# Patient Record
Sex: FEMALE | Race: WHITE | Employment: FULL TIME | URBAN - METROPOLITAN AREA
[De-identification: names, ages, dates, MRNs, and addresses within clinical notes are randomized per-mention and may not be internally consistent; named-entity substitution may affect disease eponyms.]

---

## 2019-11-14 ENCOUNTER — TRANSFERRED RECORDS (OUTPATIENT)
Dept: HEALTH INFORMATION MANAGEMENT | Facility: CLINIC | Age: 29
End: 2019-11-14
Payer: COMMERCIAL

## 2019-11-14 LAB — PAP-ABSTRACT: NORMAL

## 2022-01-13 ENCOUNTER — TRANSFERRED RECORDS (OUTPATIENT)
Dept: HEALTH INFORMATION MANAGEMENT | Facility: CLINIC | Age: 32
End: 2022-01-13

## 2022-01-25 ENCOUNTER — OFFICE VISIT (OUTPATIENT)
Dept: FAMILY MEDICINE | Facility: CLINIC | Age: 32
End: 2022-01-25
Payer: COMMERCIAL

## 2022-01-25 VITALS
DIASTOLIC BLOOD PRESSURE: 78 MMHG | RESPIRATION RATE: 12 BRPM | TEMPERATURE: 98.5 F | OXYGEN SATURATION: 96 % | HEART RATE: 84 BPM | SYSTOLIC BLOOD PRESSURE: 112 MMHG

## 2022-01-25 DIAGNOSIS — Z76.89 ESTABLISHING CARE WITH NEW DOCTOR, ENCOUNTER FOR: ICD-10-CM

## 2022-01-25 DIAGNOSIS — Z30.015 ENCOUNTER FOR INITIAL PRESCRIPTION OF VAGINAL RING HORMONAL CONTRACEPTIVE: Primary | ICD-10-CM

## 2022-01-25 PROBLEM — O42.90 DELAYED DELIVERY AFTER SROM (SPONTANEOUS RUPTURE OF MEMBRANES): Status: ACTIVE | Noted: 2021-03-27

## 2022-01-25 PROCEDURE — 99203 OFFICE O/P NEW LOW 30 MIN: CPT | Performed by: NURSE PRACTITIONER

## 2022-01-25 RX ORDER — ETONOGESTREL AND ETHINYL ESTRADIOL VAGINAL RING .015; .12 MG/D; MG/D
1 RING VAGINAL
Qty: 3 EACH | Refills: 0 | Status: SHIPPED | OUTPATIENT
Start: 2022-01-25 | End: 2022-05-10

## 2022-01-25 ASSESSMENT — PATIENT HEALTH QUESTIONNAIRE - PHQ9
SUM OF ALL RESPONSES TO PHQ QUESTIONS 1-9: 6
SUM OF ALL RESPONSES TO PHQ QUESTIONS 1-9: 6
10. IF YOU CHECKED OFF ANY PROBLEMS, HOW DIFFICULT HAVE THESE PROBLEMS MADE IT FOR YOU TO DO YOUR WORK, TAKE CARE OF THINGS AT HOME, OR GET ALONG WITH OTHER PEOPLE: SOMEWHAT DIFFICULT

## 2022-01-25 NOTE — PROGRESS NOTES
"  Assessment & Plan     Encounter for initial prescription of vaginal ring hormonal contraceptive  Discussed options of contraceptives vs mood management. Patient desires to remove Nexplanon and restart NuvaRing. If not helpful in balancing mood, would consider medication for mood at that time.   - etonogestrel-ethinyl estradiol (NUVARING) 0.12-0.015 MG/24HR vaginal ring  Dispense: 3 each; Refill: 0    Establishing care with new doctor, encounter for  Medical, surgical, family history update.                    Return in about 2 weeks (around 2/8/2022) for appointment already scheduled, nexplanon removal.    JAMAL Torres CNP  M [facility] CINDY Arroyo is a 31 year old who presents for the following health issues     HPI     Concern - Establish Care  Consult with Birth control-not liking the way pt feels on the Nexplanon, has been feeling fatigued, \"numbness\" and flat no emotions. Unsure if wants to be on birth control    nexplanon placed 6 weeks postpartum 5/2021 at OB clinc. Has used in past with potentially depressed mood.  Used NuvaRing in past with good control of periods and mood.   Does not feel mood is specific to a mood disorder but feels nexplanon is contributing.   OCP with highly depressed mood and acne.   No family history of mood disorders.     PHQ 1/25/2022 1/25/2022   PHQ-9 Total Score 6 7   Q9: Thoughts of better off dead/self-harm past 2 weeks Not at all Not at all       Review of Systems   Constitutional, HEENT, cardiovascular, pulmonary, gi and gu systems are negative, except as otherwise noted.      Objective    /78 (BP Location: Right arm, Patient Position: Chair, Cuff Size: Adult Regular)   Pulse 84   Temp 98.5  F (36.9  C) (Oral)   Resp 12   SpO2 96%   There is no height or weight on file to calculate BMI.  Physical Exam   GENERAL: healthy, alert and no distress  RESP: regular rate and effort.                   "

## 2022-02-08 ENCOUNTER — OFFICE VISIT (OUTPATIENT)
Dept: FAMILY MEDICINE | Facility: CLINIC | Age: 32
End: 2022-02-08
Payer: COMMERCIAL

## 2022-02-08 VITALS
BODY MASS INDEX: 28.56 KG/M2 | HEART RATE: 64 BPM | SYSTOLIC BLOOD PRESSURE: 112 MMHG | HEIGHT: 63 IN | OXYGEN SATURATION: 97 % | DIASTOLIC BLOOD PRESSURE: 58 MMHG | RESPIRATION RATE: 13 BRPM | TEMPERATURE: 98.3 F | WEIGHT: 161.2 LBS

## 2022-02-08 DIAGNOSIS — Z30.46 NEXPLANON REMOVAL: Primary | ICD-10-CM

## 2022-02-08 PROCEDURE — 11982 REMOVE DRUG IMPLANT DEVICE: CPT | Performed by: NURSE PRACTITIONER

## 2022-02-08 ASSESSMENT — ANXIETY QUESTIONNAIRES
GAD7 TOTAL SCORE: 1
GAD7 TOTAL SCORE: 1
6. BECOMING EASILY ANNOYED OR IRRITABLE: SEVERAL DAYS
7. FEELING AFRAID AS IF SOMETHING AWFUL MIGHT HAPPEN: NOT AT ALL
7. FEELING AFRAID AS IF SOMETHING AWFUL MIGHT HAPPEN: NOT AT ALL
1. FEELING NERVOUS, ANXIOUS, OR ON EDGE: NOT AT ALL
5. BEING SO RESTLESS THAT IT IS HARD TO SIT STILL: NOT AT ALL
2. NOT BEING ABLE TO STOP OR CONTROL WORRYING: NOT AT ALL
GAD7 TOTAL SCORE: 1
4. TROUBLE RELAXING: NOT AT ALL
3. WORRYING TOO MUCH ABOUT DIFFERENT THINGS: NOT AT ALL

## 2022-02-08 ASSESSMENT — PATIENT HEALTH QUESTIONNAIRE - PHQ9
SUM OF ALL RESPONSES TO PHQ QUESTIONS 1-9: 2
10. IF YOU CHECKED OFF ANY PROBLEMS, HOW DIFFICULT HAVE THESE PROBLEMS MADE IT FOR YOU TO DO YOUR WORK, TAKE CARE OF THINGS AT HOME, OR GET ALONG WITH OTHER PEOPLE: SOMEWHAT DIFFICULT
SUM OF ALL RESPONSES TO PHQ QUESTIONS 1-9: 2

## 2022-02-08 ASSESSMENT — MIFFLIN-ST. JEOR: SCORE: 1420.33

## 2022-02-08 NOTE — PROGRESS NOTES
Nexplanon Removal:     Is a pregnancy test required: No.  Was a consent obtained?  Yes    Dina Ocampo is here for removal of etonogestrel implant Nexplanon/Implanon    Indication: removal of implant contraceptive due to mood concerns.       Preoperative Diagnosis: etonogestrel implant  Postoperative Diagnosis: etonogestrel implant removed    Technique: On the left arm  Skin prep Betadine  Anesthesia 1% lidocaine, without epi  Procedure: Small incision (<5mm) was made at distal end of palpable implant, curved hemostat or mosquito forceps was used to isolate the implant and bring it to the incision, the fibrous capsule containing the implant  was incised and the Implant was removed intact.    EBL: minimal  Complications:  No  Tolerance:  Pt tolerated procedure well and was in stable condition.   Dressing:    A pressure bandage was placed for the next 12-24 hours.    Contraception was discussed and patient chose the following method ring      Follow up: Pt was instructed to call if bleeding, severe pain or foul smell.     JAMAL Torres CNP

## 2022-02-09 ASSESSMENT — ANXIETY QUESTIONNAIRES: GAD7 TOTAL SCORE: 1

## 2022-02-15 ENCOUNTER — TELEPHONE (OUTPATIENT)
Dept: FAMILY MEDICINE | Facility: CLINIC | Age: 32
End: 2022-02-15
Payer: COMMERCIAL

## 2022-02-15 NOTE — TELEPHONE ENCOUNTER
Patient Quality Outreach    Patient is due for the following:   Cervical Cancer Screening - PAP Needed    NEXT STEPS:   Chart routed to abstraction.    11/14/19 [facility]     Type of outreach:    Chart review performed, no outreach needed.      Questions for provider review:    None     Oralia Contreras MA

## 2022-05-10 ENCOUNTER — OFFICE VISIT (OUTPATIENT)
Dept: OBGYN | Facility: CLINIC | Age: 32
End: 2022-05-10
Payer: COMMERCIAL

## 2022-05-10 VITALS — DIASTOLIC BLOOD PRESSURE: 76 MMHG | BODY MASS INDEX: 27.12 KG/M2 | WEIGHT: 153.1 LBS | SYSTOLIC BLOOD PRESSURE: 118 MMHG

## 2022-05-10 DIAGNOSIS — N93.0 POSTCOITAL BLEEDING: ICD-10-CM

## 2022-05-10 DIAGNOSIS — R10.2 PELVIC PAIN IN FEMALE: ICD-10-CM

## 2022-05-10 DIAGNOSIS — Z12.4 SCREENING FOR CERVICAL CANCER: ICD-10-CM

## 2022-05-10 DIAGNOSIS — N92.0 INTERMENSTRUAL SPOTTING: Primary | ICD-10-CM

## 2022-05-10 DIAGNOSIS — Z11.3 SCREEN FOR STD (SEXUALLY TRANSMITTED DISEASE): ICD-10-CM

## 2022-05-10 DIAGNOSIS — N89.8 VAGINAL DISCHARGE: ICD-10-CM

## 2022-05-10 LAB
CLUE CELLS: ABNORMAL
TRICHOMONAS, WET PREP: ABNORMAL
WBC'S/HIGH POWER FIELD, WET PREP: ABNORMAL
YEAST, WET PREP: ABNORMAL

## 2022-05-10 PROCEDURE — G0145 SCR C/V CYTO,THINLAYER,RESCR: HCPCS | Performed by: OBSTETRICS & GYNECOLOGY

## 2022-05-10 PROCEDURE — 87591 N.GONORRHOEAE DNA AMP PROB: CPT | Performed by: OBSTETRICS & GYNECOLOGY

## 2022-05-10 PROCEDURE — 99204 OFFICE O/P NEW MOD 45 MIN: CPT | Performed by: OBSTETRICS & GYNECOLOGY

## 2022-05-10 PROCEDURE — 87210 SMEAR WET MOUNT SALINE/INK: CPT | Performed by: OBSTETRICS & GYNECOLOGY

## 2022-05-10 PROCEDURE — 87491 CHLMYD TRACH DNA AMP PROBE: CPT | Performed by: OBSTETRICS & GYNECOLOGY

## 2022-05-10 PROCEDURE — 87624 HPV HI-RISK TYP POOLED RSLT: CPT | Performed by: OBSTETRICS & GYNECOLOGY

## 2022-05-10 NOTE — NURSING NOTE
"Chief Complaint   Patient presents with     Consult     AUB, for the past month patient reports spotting daily, and then bleeding after intercourse. Spotting is usually dark brown, but after intercourse it is bright red. Patient reports increased pelvic pressure, and rectal pain. No c/o pain with urination, odor, itching. Spotting is not heavy enough to wear pad/tampon       Initial /76   Wt 69.4 kg (153 lb 1.6 oz)   LMP 04/15/2022   BMI 27.12 kg/m   Estimated body mass index is 27.12 kg/m  as calculated from the following:    Height as of 22: 1.6 m (5' 3\").    Weight as of this encounter: 69.4 kg (153 lb 1.6 oz).  BP completed using cuff size: regular    Questioned patient about current smoking habits.  Pt. has never smoked.          The following HM Due: pap smear      Geovanny Tracy CMA               "

## 2022-05-10 NOTE — PROGRESS NOTES
SUBJECTIVE:   CC: spotting, post-coital bleeding                                               Dina Ocampo is a 31 year old  female who presents to clinic today for daily spotting x30 days. Previously on oral contraceptives prior to birth of her son 13 months ago, never had any spotting on that. Used the nexplanon briefly postpartum but had it removed 2022, 2 regular menses since then lasting 4-5 days, not too heavy. No dysmenorrhea. Patient's last menstrual period was 04/15/2022. Not currently using contraception. Would be ok with pregnancy but not actively trying. Home urine pregnancy test yesterday and 1 week ago both negative.   With intercourse she reports bright red spotting/bleeding. Last night for the first time had discomfort following sex. Crampy low pelvic pain preceding intercourse     Problem list and histories reviewed & adjusted, as indicated.  Additional history: as documented.    Patient Active Problem List   Diagnosis     Acne     Adjustment disorder with depressed mood     Breech birth     Delayed delivery after SROM (spontaneous rupture of membranes)     Major depressive disorder, recurrent episode, moderate (H)     Past Surgical History:   Procedure Laterality Date      SECTION        Social History     Tobacco Use     Smoking status: Never Smoker     Smokeless tobacco: Never Used   Substance Use Topics     Alcohol use: Yes     Comment: rare      Problem (# of Occurrences) Relation (Name,Age of Onset)    No Known Problems (2) Mother, Father            No current outpatient medications on file prior to visit.  No current facility-administered medications on file prior to visit.    Allergies   Allergen Reactions     Sulfa Drugs        OBJECTIVE:   /76   Wt 69.4 kg (153 lb 1.6 oz)   LMP 04/15/2022   BMI 27.12 kg/m     Const: sitting in chair in no acute distress, comfortable  Eyes: no scleral icterus, EOMI  CV: regular rate, well perfused  Pulm: no increased work  of breathing, no cough  Skin: warm and dry, no rashes/lesions  Psych: mood stable, appropriate affect  Neuro: A+Ox3   : External genitalia normal well-estrogenized, healthy tissue.  No obvious excoriations, lesions, or rashes. Bartholins, urethra, normal.  Normal moist pink vaginal mucosa.  SSE: Normal cervix, normal physiologic discharge, no blood in the vault   Bimanual: No CMT, small mobile uterus. No adnexal masses or tenderness appreciated throughout bimanual exam.     ASSESSMENT/PLAN:                                                    Dina Ocampo is a 31 year old female  here for evaluation of 1 month of spotting and new onset pelvic cramping x48 hours.    1. Intermenstrual spotting  - in a non-contracepting patient who is sexually active, cannot rule out pregnancy. However, with a negative home urine pregnancy test yesterday plan to have her repeat again at home in 1 week unless her menses begins.  -  Gonorrhea/chlam   - US Pelvic Transabdominal and Transvaginal; Future    2. Pelvic pain in female  - suspect primary dysmenorrhea/moliminal symptoms as she is due for a period any day, but again, cannot rule out early pregnancy which may also lead to cramping. Reviewed prostaglandin mediated cramping. ddx includes STI and structural abnormalities. Will further evaluate with US, gonorrhea/chlam and wet prep obtained today.     Obtain US and f/u in 2-4w.       Agnieszka Patel MD  Obstetrics and Gynecology   [facility]

## 2022-05-10 NOTE — PATIENT INSTRUCTIONS
Dysmenorrhea:    - We reviewed causes of dysmenorrhea, including prostaglandin mediated cramping, structural abnormalities (fibroids, polyps, adenomyosis), and endometriosis.   - Scheduled Ibuprofen 600mg every 4 hours or 800mg every 6 hours OR Aleve 2 tabs every 8-12 hours throughout menses and starting the night before bleeding. Additionally, heating pads to the lower abdomen can be helpful.  -  Reviewed options for hormonal birth control, including oral contraceptive pills, patches, vaginal ring, implant, shot, IUD (hormonal), as well as hysteroscopy D&C, endometrial ablation or hysterectomy, if refractory to the above methods.     - will obtain pelvic US and review cause and management options when results are available.

## 2022-05-11 LAB
C TRACH DNA SPEC QL NAA+PROBE: NEGATIVE
N GONORRHOEA DNA SPEC QL NAA+PROBE: NEGATIVE

## 2022-05-13 LAB
BKR LAB AP GYN ADEQUACY: NORMAL
BKR LAB AP GYN INTERPRETATION: NORMAL
BKR LAB AP HPV REFLEX: NORMAL
BKR LAB AP LMP: NORMAL
BKR LAB AP PREVIOUS ABNORMAL: NORMAL
PATH REPORT.COMMENTS IMP SPEC: NORMAL
PATH REPORT.COMMENTS IMP SPEC: NORMAL
PATH REPORT.RELEVANT HX SPEC: NORMAL

## 2022-05-17 LAB
HUMAN PAPILLOMA VIRUS 16 DNA: NEGATIVE
HUMAN PAPILLOMA VIRUS 18 DNA: NEGATIVE
HUMAN PAPILLOMA VIRUS FINAL DIAGNOSIS: NORMAL
HUMAN PAPILLOMA VIRUS OTHER HR: NEGATIVE

## 2022-05-27 ENCOUNTER — ANCILLARY PROCEDURE (OUTPATIENT)
Dept: ULTRASOUND IMAGING | Facility: CLINIC | Age: 32
End: 2022-05-27
Attending: OBSTETRICS & GYNECOLOGY
Payer: COMMERCIAL

## 2022-05-27 DIAGNOSIS — N92.0 INTERMENSTRUAL SPOTTING: ICD-10-CM

## 2022-05-27 PROCEDURE — 76856 US EXAM PELVIC COMPLETE: CPT | Performed by: OBSTETRICS & GYNECOLOGY

## 2022-05-27 PROCEDURE — 76830 TRANSVAGINAL US NON-OB: CPT | Performed by: OBSTETRICS & GYNECOLOGY

## 2022-07-03 ENCOUNTER — HEALTH MAINTENANCE LETTER (OUTPATIENT)
Age: 32
End: 2022-07-03

## 2022-10-10 ENCOUNTER — TRANSFERRED RECORDS (OUTPATIENT)
Dept: HEALTH INFORMATION MANAGEMENT | Facility: CLINIC | Age: 32
End: 2022-10-10

## 2022-10-21 ENCOUNTER — TRANSFERRED RECORDS (OUTPATIENT)
Dept: HEALTH INFORMATION MANAGEMENT | Facility: CLINIC | Age: 32
End: 2022-10-21

## 2022-10-24 ENCOUNTER — MEDICAL CORRESPONDENCE (OUTPATIENT)
Dept: HEALTH INFORMATION MANAGEMENT | Facility: CLINIC | Age: 32
End: 2022-10-24

## 2022-10-26 ENCOUNTER — TRANSCRIBE ORDERS (OUTPATIENT)
Dept: OTHER | Age: 32
End: 2022-10-26

## 2022-10-26 DIAGNOSIS — R79.82 CRP ELEVATED: ICD-10-CM

## 2022-10-26 DIAGNOSIS — Z13.29 SCREENING FOR THYROID DISORDER: Primary | ICD-10-CM

## 2023-01-14 ENCOUNTER — HEALTH MAINTENANCE LETTER (OUTPATIENT)
Age: 33
End: 2023-01-14

## 2023-02-03 ENCOUNTER — HOSPITAL ENCOUNTER (OUTPATIENT)
Dept: GENERAL RADIOLOGY | Facility: CLINIC | Age: 33
Discharge: HOME | End: 2023-02-03
Attending: NURSE PRACTITIONER | Admitting: NURSE PRACTITIONER
Payer: COMMERCIAL

## 2023-02-03 DIAGNOSIS — Z31.9 ENCOUNTER FOR PROCREATIVE MANAGEMENT, UNSPECIFIED: ICD-10-CM

## 2023-02-03 PROCEDURE — 58340 CATHETER FOR HYSTEROGRAPHY: CPT

## 2023-07-22 ENCOUNTER — HEALTH MAINTENANCE LETTER (OUTPATIENT)
Age: 33
End: 2023-07-22

## 2023-09-11 ENCOUNTER — HOSPITAL ENCOUNTER (EMERGENCY)
Facility: CLINIC | Age: 33
Discharge: HOME | End: 2023-09-11
Attending: EMERGENCY MEDICINE | Admitting: EMERGENCY MEDICINE
Payer: COMMERCIAL

## 2023-09-11 VITALS
WEIGHT: 145 LBS | RESPIRATION RATE: 18 BRPM | BODY MASS INDEX: 25.69 KG/M2 | TEMPERATURE: 97.5 F | SYSTOLIC BLOOD PRESSURE: 108 MMHG | HEART RATE: 64 BPM | OXYGEN SATURATION: 100 % | HEIGHT: 63 IN | DIASTOLIC BLOOD PRESSURE: 70 MMHG

## 2023-09-11 DIAGNOSIS — O00.90 ECTOPIC PREGNANCY WITHOUT INTRAUTERINE PREGNANCY, UNSPECIFIED LOCATION: ICD-10-CM

## 2023-09-11 LAB
ABO/RH(D): NORMAL
ALBUMIN SERPL BCG-MCNC: 5 G/DL (ref 3.5–5.2)
ALP SERPL-CCNC: 55 U/L (ref 35–104)
ALT SERPL W P-5'-P-CCNC: 14 U/L (ref 0–50)
ANION GAP SERPL CALCULATED.3IONS-SCNC: 10 MMOL/L (ref 7–15)
ANTIBODY SCREEN: NEGATIVE
AST SERPL W P-5'-P-CCNC: 18 U/L (ref 0–45)
BASOPHILS # BLD AUTO: 0 10E3/UL (ref 0–0.2)
BASOPHILS NFR BLD AUTO: 1 %
BILIRUB DIRECT SERPL-MCNC: <0.2 MG/DL (ref 0–0.3)
BILIRUB SERPL-MCNC: 0.4 MG/DL
BUN SERPL-MCNC: 12.6 MG/DL (ref 6–20)
CALCIUM SERPL-MCNC: 9.6 MG/DL (ref 8.6–10)
CHLORIDE SERPL-SCNC: 103 MMOL/L (ref 98–107)
CREAT SERPL-MCNC: 0.77 MG/DL (ref 0.51–0.95)
DEPRECATED HCO3 PLAS-SCNC: 25 MMOL/L (ref 22–29)
EGFRCR SERPLBLD CKD-EPI 2021: >90 ML/MIN/1.73M2
EOSINOPHIL # BLD AUTO: 0 10E3/UL (ref 0–0.7)
EOSINOPHIL NFR BLD AUTO: 0 %
ERYTHROCYTE [DISTWIDTH] IN BLOOD BY AUTOMATED COUNT: 11.7 % (ref 10–15)
GLUCOSE SERPL-MCNC: 108 MG/DL (ref 70–99)
HCG INTACT+B SERPL-ACNC: 56 MIU/ML
HCT VFR BLD AUTO: 43.5 % (ref 35–47)
HGB BLD-MCNC: 14.6 G/DL (ref 11.7–15.7)
HOLD SPECIMEN: NORMAL
HOLD SPECIMEN: NORMAL
IMM GRANULOCYTES # BLD: 0 10E3/UL
IMM GRANULOCYTES NFR BLD: 0 %
LYMPHOCYTES # BLD AUTO: 1.3 10E3/UL (ref 0.8–5.3)
LYMPHOCYTES NFR BLD AUTO: 18 %
MCH RBC QN AUTO: 29.8 PG (ref 26.5–33)
MCHC RBC AUTO-ENTMCNC: 33.6 G/DL (ref 31.5–36.5)
MCV RBC AUTO: 89 FL (ref 78–100)
MONOCYTES # BLD AUTO: 0.4 10E3/UL (ref 0–1.3)
MONOCYTES NFR BLD AUTO: 5 %
NEUTROPHILS # BLD AUTO: 5.2 10E3/UL (ref 1.6–8.3)
NEUTROPHILS NFR BLD AUTO: 76 %
NRBC # BLD AUTO: 0 10E3/UL
NRBC BLD AUTO-RTO: 0 /100
PLATELET # BLD AUTO: 289 10E3/UL (ref 150–450)
POTASSIUM SERPL-SCNC: 4.2 MMOL/L (ref 3.4–5.3)
PROT SERPL-MCNC: 7.2 G/DL (ref 6.4–8.3)
RBC # BLD AUTO: 4.9 10E6/UL (ref 3.8–5.2)
SODIUM SERPL-SCNC: 138 MMOL/L (ref 136–145)
SPECIMEN EXPIRATION DATE: NORMAL
URATE SERPL-MCNC: 3 MG/DL (ref 2.4–5.7)
WBC # BLD AUTO: 6.9 10E3/UL (ref 4–11)

## 2023-09-11 PROCEDURE — 85004 AUTOMATED DIFF WBC COUNT: CPT | Performed by: EMERGENCY MEDICINE

## 2023-09-11 PROCEDURE — 82248 BILIRUBIN DIRECT: CPT | Performed by: EMERGENCY MEDICINE

## 2023-09-11 PROCEDURE — 80053 COMPREHEN METABOLIC PANEL: CPT | Performed by: EMERGENCY MEDICINE

## 2023-09-11 PROCEDURE — 85025 COMPLETE CBC W/AUTO DIFF WBC: CPT | Performed by: EMERGENCY MEDICINE

## 2023-09-11 PROCEDURE — 99284 EMERGENCY DEPT VISIT MOD MDM: CPT

## 2023-09-11 PROCEDURE — 250N000011 HC RX IP 250 OP 636: Mod: JZ | Performed by: EMERGENCY MEDICINE

## 2023-09-11 PROCEDURE — 99284 EMERGENCY DEPT VISIT MOD MDM: CPT | Mod: 25

## 2023-09-11 PROCEDURE — 84550 ASSAY OF BLOOD/URIC ACID: CPT | Performed by: EMERGENCY MEDICINE

## 2023-09-11 PROCEDURE — 84702 CHORIONIC GONADOTROPIN TEST: CPT | Performed by: EMERGENCY MEDICINE

## 2023-09-11 PROCEDURE — 86900 BLOOD TYPING SEROLOGIC ABO: CPT | Performed by: EMERGENCY MEDICINE

## 2023-09-11 PROCEDURE — 96401 CHEMO ANTI-NEOPL SQ/IM: CPT

## 2023-09-11 PROCEDURE — 36415 COLL VENOUS BLD VENIPUNCTURE: CPT | Performed by: EMERGENCY MEDICINE

## 2023-09-11 RX ORDER — METHOTREXATE 25 MG/ML
50 INJECTION INTRA-ARTERIAL; INTRAMUSCULAR; INTRATHECAL; INTRAVENOUS ONCE
Status: COMPLETED | OUTPATIENT
Start: 2023-09-11 | End: 2023-09-11

## 2023-09-11 RX ADMIN — METHOTREXATE 85.5 MG: 25 INJECTION, SOLUTION INTRA-ARTERIAL; INTRAMUSCULAR; INTRAVENOUS at 14:00

## 2023-09-11 ASSESSMENT — ACTIVITIES OF DAILY LIVING (ADL): ADLS_ACUITY_SCORE: 35

## 2023-09-11 NOTE — ED TRIAGE NOTES
Patient reports being seen this morning at her OB clinic for possible ectopic pregnancy.  US done per pt.  Patient reports being about 7 weeks pregnant.  She reports vaginal bleeding since 9/6.  ABCs intact,A&Ox4.     Triage Assessment       Row Name 09/11/23 1028       Triage Assessment (Adult)    Airway WDL WDL       Respiratory WDL    Respiratory WDL WDL       Skin Circulation/Temperature WDL    Skin Circulation/Temperature WDL WDL       Cardiac WDL    Cardiac WDL WDL       Peripheral/Neurovascular WDL    Peripheral Neurovascular WDL WDL       Cognitive/Neuro/Behavioral WDL    Cognitive/Neuro/Behavioral WDL WDL

## 2023-09-11 NOTE — DISCHARGE INSTRUCTIONS
You need to return immediately to the emergency department if you have sudden worsening of abdominal pain, sudden worsening of bleeding (especially if it is heavier than feeling 1 pad per hour), or any other concerns.    Otherwise, you need to follow-up with your obstetrician on 9/15/2023 and 9/18/2023 for more blood tests.

## 2023-09-11 NOTE — ED PROVIDER NOTES
"    History     Chief Complaint:  Vaginal Bleeding - Pregnant       The history is provided by the patient.      Dina Ocampo is a 32 year old  female at 7w0d by stated LMP who presents with probable ectopic pregnancy and vaginal bleeding.  She has had left pelvic cramping for about 2 weeks.  She developed vaginal bleeding 5 days ago and was seen by her OB who felt she was having a miscarriage.  Her vaginal bleeding worsened over the last couple of days but seems to be improving now.  She returned today to obstetrics for follow-up ultrasound which revealed no intrauterine pregnancy and a \"round complex structure with vascular supply in the left adnexa medial to the left ovary, suspicious for ectopic 1.4 x 1.2 x 1.1 cm.\"  She was sent to the emergency department for \"labs and a shot\" per her understanding.    Independent Historian:    As above    Review of External Notes:  Records provided by [facility] including ultrasound, as above.  This also includes hCG of 64 on 2023 and 47 23    Past Medical History:    Past Medical History:   Diagnosis Date    No pertinent past medical history      Past Surgical History:    Past Surgical History:   Procedure Laterality Date     SECTION        Physical Exam   Patient Vitals for the past 24 hrs:   BP Temp Temp src Pulse Resp SpO2 Height Weight   23 1437 108/70 -- -- 64 18 100 % -- --   23 1417 113/76 -- -- 69 18 99 % -- --   23 1100 -- -- -- -- -- -- 1.6 m (5' 3\") 65.8 kg (145 lb)   23 1029 (!) 124/92 97.5  F (36.4  C) Temporal 82 16 99 % -- --        Physical Exam  General: Well-developed and well-nourished. Well appearing young  woman. Cooperative.  Head:  Atraumatic.  Eyes:  Conjunctivae, lids, and sclerae are normal.  ENT:    Normal nose. Moist mucous membranes.  Neck:  Supple. Normal range of motion.  CV:  Well perfused.  Resp:  No respiratory distress.  GI:  Soft. Non-distended. " Non-tender.    MS:  Normal ROM.   Skin:  Warm. Non-diaphoretic. No pallor.  Neuro:  Awake. A&Ox3. Normal strength.  Psych: Appropriate mood and affect. Normal speech.  Vitals reviewed.    Emergency Department Course   Laboratory:  Labs Ordered and Resulted from Time of ED Arrival to Time of ED Departure   BASIC METABOLIC PANEL - Abnormal       Result Value    Sodium 138      Potassium 4.2      Chloride 103      Carbon Dioxide (CO2) 25      Anion Gap 10      Urea Nitrogen 12.6      Creatinine 0.77      Calcium 9.6      Glucose 108 (*)     GFR Estimate >90     HCG QUANTITATIVE PREGNANCY - Abnormal    hCG Quantitative 56 (*)    HEPATIC FUNCTION PANEL - Normal    Protein Total 7.2      Albumin 5.0      Bilirubin Total 0.4      Alkaline Phosphatase 55      AST 18      ALT 14      Bilirubin Direct <0.20     URIC ACID - Normal    Uric Acid 3.0     CBC WITH PLATELETS AND DIFFERENTIAL    WBC Count 6.9      RBC Count 4.90      Hemoglobin 14.6      Hematocrit 43.5      MCV 89      MCH 29.8      MCHC 33.6      RDW 11.7      Platelet Count 289      % Neutrophils 76      % Lymphocytes 18      % Monocytes 5      % Eosinophils 0      % Basophils 1      % Immature Granulocytes 0      NRBCs per 100 WBC 0      Absolute Neutrophils 5.2      Absolute Lymphocytes 1.3      Absolute Monocytes 0.4      Absolute Eosinophils 0.0      Absolute Basophils 0.0      Absolute Immature Granulocytes 0.0      Absolute NRBCs 0.0     TYPE AND SCREEN, ADULT    ABO/RH(D) O POS      Antibody Screen Negative      SPECIMEN EXPIRATION DATE [id number]     ABO/RH TYPE AND SCREEN      Emergency Department Course & Assessments:  Interventions:  Medications   methotrexate sodium (PF) injection 85.5 mg (85.5 mg Intramuscular $Given 9/11/23 1400)      Assessments:  1244 I updated the patient and she is comfortable with plan.     Independent Interpretation (X-rays, CTs, rhythm strip):  Not applicable    Consultations/Discussion of Management or Tests:  1409 I  spoke with Dr. Dos Santos, OB/Gyn, regarding the patient. He agrees with plan.     Social Determinants of Health affecting care:  Supportive   Established care provider     Disposition:  Discharged  Impression & Plan    Medical Decision Making:  Dina is a  at 7w0d by stated LMP who has had left pelvic cramping for couple of weeks and then developed vaginal bleeding 5 days ago. This bleeding has improved, but not resolved. She was seen by her OB and had a downward trending hCG to 47 and there was concern for miscarriage.  Follow-up today (3 days later) with ultrasound revealed probable ectopic pregnancy and she was referred to the emergency department seemingly for methotrexate injection.  I did review paperwork sent with the patient and faxed by her clinic which revealed a complex structure in her left adnexa suspicious for ectopic.  She appears well on exam without significant abdominal tenderness.  There is no indication for repeat imaging. There is no indication for pelvic exam.    Laboratory studies were obtained which are unremarkable.  hCG is very low at 56, inappropriate for gestational age, consistent with nonviable pregnancy.  She is Rh+ and does not require RhoGAM.  Her case was reviewed with , OB/Gyn, who agreed with plan for methotrexate.  She was given 50 mg/m  IM methotrexate without issue.  She understands plan for day 4 and day 7 beta-hCG levels with her obstetric clinic.  In the meantime I have given her strict return precautions including, but not limited to, sudden worsening of abdominal pain or bleeding.  All questions answered.  Amenable to discharge.      Diagnosis:    ICD-10-CM    1. Ectopic pregnancy without intrauterine pregnancy, unspecified location  O00.90            Discharge Medications:  There are no discharge medications for this patient.     2023   Trini Vega MD [place], Kylie S, MD  23 0852

## 2024-01-13 LAB
HEPATITIS B SURFACE ANTIGEN (EXTERNAL): NONREACTIVE
HIV1+2 AB SERPL QL IA: NONREACTIVE
RUBELLA ANTIBODY IGG (EXTERNAL): NORMAL
VDRL (SYPHILIS) (EXTERNAL): NONREACTIVE

## 2024-03-11 ENCOUNTER — TRANSFERRED RECORDS (OUTPATIENT)
Dept: HEALTH INFORMATION MANAGEMENT | Facility: CLINIC | Age: 34
End: 2024-03-11

## 2024-03-27 ENCOUNTER — PRE VISIT (OUTPATIENT)
Dept: MATERNAL FETAL MEDICINE | Facility: CLINIC | Age: 34
End: 2024-03-27

## 2024-03-27 ENCOUNTER — TRANSCRIBE ORDERS (OUTPATIENT)
Dept: MATERNAL FETAL MEDICINE | Facility: CLINIC | Age: 34
End: 2024-03-27

## 2024-03-27 DIAGNOSIS — O26.90 PREGNANCY RELATED CONDITION, ANTEPARTUM: Primary | ICD-10-CM

## 2024-03-29 ENCOUNTER — OFFICE VISIT (OUTPATIENT)
Dept: MATERNAL FETAL MEDICINE | Facility: CLINIC | Age: 34
End: 2024-03-29
Attending: ADVANCED PRACTICE MIDWIFE
Payer: COMMERCIAL

## 2024-03-29 ENCOUNTER — MEDICAL CORRESPONDENCE (OUTPATIENT)
Dept: HEALTH INFORMATION MANAGEMENT | Facility: CLINIC | Age: 34
End: 2024-03-29

## 2024-03-29 ENCOUNTER — HOSPITAL ENCOUNTER (OUTPATIENT)
Dept: ULTRASOUND IMAGING | Facility: CLINIC | Age: 34
Discharge: HOME | End: 2024-03-29
Attending: ADVANCED PRACTICE MIDWIFE
Payer: COMMERCIAL

## 2024-03-29 DIAGNOSIS — O28.3 ABNORMAL FETAL ULTRASOUND: Primary | ICD-10-CM

## 2024-03-29 DIAGNOSIS — O28.3 ABNORMAL FETAL ULTRASOUND: ICD-10-CM

## 2024-03-29 DIAGNOSIS — O28.0 ABNORMAL MATERNAL SERUM SCREENING TEST: Primary | ICD-10-CM

## 2024-03-29 DIAGNOSIS — O26.90 PREGNANCY RELATED CONDITION, ANTEPARTUM: ICD-10-CM

## 2024-03-29 DIAGNOSIS — O28.9 ABNORMAL FINDINGS ON PRENATAL SCREENING: ICD-10-CM

## 2024-03-29 DIAGNOSIS — O35.03X0 CHOROID PLEXUS CYST OF FETUS AFFECTING CARE OF MOTHER, ANTEPARTUM, SINGLE OR UNSPECIFIED FETUS: ICD-10-CM

## 2024-03-29 PROCEDURE — 76805 OB US >/= 14 WKS SNGL FETUS: CPT | Mod: 26 | Performed by: OBSTETRICS & GYNECOLOGY

## 2024-03-29 PROCEDURE — 96040 HC GENETIC COUNSELING, EACH 30 MINUTES: CPT

## 2024-03-29 PROCEDURE — 76805 OB US >/= 14 WKS SNGL FETUS: CPT

## 2024-03-29 NOTE — NURSING NOTE
Patient presents to [facility] for 2/3 complete at 16w3d due to CPCs, short long bones, NIPT low fetal fraction x2. Denies LOF, vaginal bleeding or cramping/contractions. SBAR given to M MD, see their note in Epic.

## 2024-03-29 NOTE — PROGRESS NOTES
Please refer to ultrasound report under 'Imaging' Studies of 'Chart Review' tabs.    Jarret Ocasio M.D.     F: none  E: replete as needed  N: npo  DVT ppx: lovenox

## 2024-03-29 NOTE — PROGRESS NOTES
[facility]  Genetic Counseling Consult    Patient:  Dina Ocampo YOB: 1990   Date of Service:  3/29/24   MRN: [id number]    Dina was seen at the [facility] for genetic consultation. The indication for genetic counseling is positive or abnormal genetic screening results and abnormal fetal ultrasound at outside clinic. The patient was accompanied to this visit by their partner, Marco.    The session was conducted in English.      IMPRESSION/ PLAN   1. Dina had genetic screening earlier in this pregnancy. Dina had non-invasive prenatal test (NIPT) drawn twice with both results being no call result due to low fetal fraction. Please see details of results below.      2. During today's [facility] visit, Dina had elected to proceed with a genetic amniocentesis today. However, following a normal ultrasound decided not to proceed with an amniocentesis.     3. Dina had a early second trimester anatomy ultrasound today. Please see the ultrasound report for further details.    4. Further recommendations include a fetal anatomy level II ultrasound with [facility]. The upcoming ultrasound has been scheduled for 2024.    PREGNANCY HISTORY   /Parity:       Dina's pregnancy history is significant for:   Term: 3/27/2021  SAB: ectopic     CURRENT PREGNANCY   Current Age: 33 year old     Age at Delivery: 33 year old    ZIA: 9/10/2024, by Last Menstrual Period                                     Gestational Age: 16w3d    This pregnancy is a single gestation.     This pregnancy was conceived spontaneously.    We discussed common aneuploidy markers identified on level II ultrasounds. Aneuploidy means an abnormal number of chromosomes. These markers are used to adjust age-related risk for chromosome abnormalities. While markers are often seen in babies without a chromosome condition, they are seen slightly more often in  "babies with a condition. Therefore, each marker is associated with a different increase in risk but alone not diagnose a condition, such a Down syndrome.     The outside ultrasound Dina had at 16w1d identified choroid plexus cysts (CPC), and shortening of the femur and humerus. CPC  is a fluid filled cyst within a part of the brain that makes cerebrospinal fluid called the coroid plexus. Approximately 1% of second trimester prenatal ultrasounds reveal a CPC. In isolation, the finding of a CPC are not thought to increase aneuploidy risks. In the presence of other aneuploidy markers, CPC may be one feature of a condition, such as trisomy 18. However, it would be rare for a fetus with trisomy 18 to have only a CPC on ultrasound.     We discussed that the femur is a long bone in the upper leg and the humerus is a long bone in the upper arm. We reviewed that having two failed NIPTs due to low fetal fraction and the ultrasound showing CPC and shortened long bones increases the risk for aneuploidy above her age related risk.  We reviewed that shortened long bones can be associated with single gene causes such a skeletal dysplasias.     MEDICAL HISTORY   Dina s reported medical history is not expected to impact pregnancy management or risks to fetal development.     FAMILY HISTORY   A three-generation pedigree was obtained today and is scanned under the \"Media\" tab in Epic. The family history was reported by Dina and their partner.    The following significant findings were reported today:   Dina's son Milton (3yrs) is healthy   The father of the pregnancy, Marco (34yrs), is healthy. Marco has no other children besides their son Milton.  Marco reported that his maternal grandmother had one stillbirth.     Otherwise, the reported family history is unremarkable for multiple miscarriages, birth defects, intellectual disabilities, known genetic conditions, and consanguinity.       RISK ASSESSMENT FOR CHROMOSOME CONDITIONS " "  We explained that the risk for fetal chromosome abnormalities increases with maternal age. We discussed specific features of common chromosome abnormalities, including Down syndrome, trisomy 13, trisomy 18, and sex chromosome trisomies.    At age 33 at midtrimester, the risk to have a baby with Down syndrome is 1 in 508.   At age 33 at midtrimester, the risk to have a baby with any chromosome abnormality is 1 in 254.     Dina had genetic screening earlier in this pregnancy. Their non-invasive prenatal test was a no call result due to low fetal fraction. Please see details of results below.        The patient had a Non-Invasive Prenatal Test (NIPT) earlier in pregnancy. More specifically, Dina  had a NIPT drawn at 10 weeks and a second attempt at 12 weeks.  Both  results were NO CALL due to low fetal fraction. We discussed the possible outcomes associated with a \"no call\" NIPT result including::  Dina's result could represent a false positive and her pregnancy may be at low risk for aneuploidy. NIPT has been clinically validated in patients at increased risk for aneuploidy (eg. advanced maternal age, abnormal ultrasound findings, abnormal previous screening) and it is generally recommended (per ACOG guidelines) that NIPT is offered primarily to high risk patient populations. NIPT in low risk patient populations may have a higher false positive rate, as compared to NIPT in the high risk patient population. Also, patients with a high BMI have an increased risk for no call results due to low fetal fractions.   No call results have been associated with an increased risk for aneuploidy. Other explanations for no call results can include placental, maternal, or fetal mosaicism for other chromosome anomalies. NIPT does not replace the accuracy obtained from invasive prenatal testing such as amniocentesis.    RISK ASSESSMENT FOR INHERITED CONDITIONS   We discussed that every pregnancy has a chance to have an " inherited single-gene condition, even when there is no family history of that condition. In fact, approximately 90% of couples at an increased reproductive risk for an inherited condition have no family history of that condition. The average person may be a carrier for 5-10 different genetic variants that can increase the chance for their pregnancies to have that condition. We discussed autosomal recessive conditions and X-linked conditions. Autosomal recessive conditions happen when a mutation has been inherited from the egg and sperm and include conditions like cystic fibrosis, thalassemia, hearing loss, spinal muscular atrophy, and more. X-linked conditions happen when a mutation has been inherited from the egg and include conditions like fragile X syndrome.     We reviewed that when both biological parents carry a harmful genetic change in a gene associated with autosomal recessive inheritance, each of their pregnancies has a 1 in 4 (25%) chance to be affected by that condition. With x-linked conditions, the specific risk generally depends on the chromosomal sex of the fetus, with XY individuals (generally male) being most severely affected.     About [place]  Screening    The patient does NOT have a family history of known inherited conditions. This does NOT mean the patient and/or their partner is not a carrier of a condition. Approximately 90% of couples at an increased reproductive risk for an inherited condition have no family history of that condition. If the patient is interested in further discussing the option of carrier screening, MFM would be available to assist in coordination if desired.  GENETIC TESTING OPTIONS   Genetic testing during a pregnancy includes screening and diagnostic procedures.      Screening tests are non-invasive which means no risk to the pregnancy and includes ultrasounds and blood work. The benefits and limitations of screening were reviewed. Screening tests provide a risk  assessment (chance) specific to the pregnancy for certain fetal chromosome abnormalities but cannot definitively diagnose or exclude a fetal chromosome abnormality. Follow-up genetic counseling and consideration of diagnostic testing is recommended with any abnormal screening result. Diagnostic testing during a pregnancy is more certain and can test for more conditions. However, the tests do have a risk of miscarriage that requires careful consideration. These tests can detect fetal chromosome abnormalities with greater than 99% certainty. Results can be compromised by maternal cell contamination or mosaicism and are limited by the resolution of current genetic testing technology.     There is no screening or diagnostic test that detects all forms of birth defects or intellectual disability.     We discussed the following screening options:   Non-invasive prenatal testing (NIPT)  Also called cell-free DNA screening because it detect chromosome fragments from the placenta in the pregnant person's blood.  Can be done any time after 10 weeks gestation.  Screens for trisomy 21, trisomy 18, trisomy 13, and sex chromosome aneuploidies. ACMG also recommends consideration of screening for 22q11.2 microdeletion syndrome.  Does not screen for all known chromosomal conditions.  Even with negative results, a residual risk for screened conditions remains.  Cannot screen for open neural tube defects, maternal serum AFP after 15 weeks is recommended      We discussed the following ultrasound options:  2/3 ultrasound (Early Second Trimester)  Ultrasound between 14-18 weeks gestation  Early anatomy ultrasound for major birth defects  Commonly has suboptimal views due to early gestation so not a substitute for the 18-20w ultrasound  Recommended for pregnancies with abnormal screening results, those interested in amniocentesis, or other risk factors  Comprehensive level II ultrasound (Fetal Anatomy Ultrasound)  Ultrasound done between  18-20 weeks gestation  Screens for major birth defects and markers for aneuploidy (like trisomy 21 and trisomy 18)  Includes looking at the fetus/baby's growth, heart, organs (stomach, kidneys), placenta, and amniotic fluid  Fetal Echocardiogram  Ultrasound done between 22-24 weeks gestation  Screen for heart defects  Recommended if there are concerns about the heart or other indications like IVF pregnancy or maternal diabetes    We discussed the following diagnostic options:   Amniocentesis  Invasive diagnostic procedure done after 15 weeks gestation  The procedure collects a small sample of amniotic fluid for the purpose of chromosomal testing and/or other genetic testing  Diagnostic result; more than 99% sensitivity for fetal chromosome abnormalities  Testing for AFP in the amniotic fluid can test for open neural tube defects      We reviewed the amniocentesis procedure consent form as well as the genetic testing consent form.     Fluorescence In Situ Hybridization (FISH) analysis is a preliminary targeted chromosome analysis that determines how many copies of chromosome 13, 18, 21, X, and Y are present in the prenatal sample. FISH cannot detect all chromosomal differences and cannot exclude mosaicism. We discussed that FISH results are considered preliminary with chromosome analysis or a microarray result determined final. There is a less than 1% chance for FISH results to be discordant from the final results. We often encourage patients to make pregnancy decisions based on those final results but understand that some patients may feel comfortable making those after FISH given the context.      Chromosome analysis (karyotype/g-bands) assess each chromosome to provide information regarding number, structure, and location of all chromosomes. This can rule in or rule out full chromosome conditions, such as Down syndrome.     Microarray testing involves looking for small extra (duplications) or missing (deletions)  pieces of DNA within the chromosomes.  Chromosomal deletions and duplications may cause problems with an individual's health and development including learning disabilities, developmental delays, growth issues, physical differences, and psychiatric challenges. The specific symptoms would depend on the size and gene content of the specific chromosomal region involved.  Microarray testing can also identify whether there are areas within a pair of chromosomes that are too similar to each other, which could indicate that the individual's parents may be related to each other such as cousins, or could represent a phenomenon known as uniparental disomy (UPD) which is where an individual inherits more of a specific chromosome region from one parent than the other parent.  Depending on the chromosome(s) involved, this  genetic similarity  can sometimes lead to growth, developmental and/or physical problems for the individual. Sometimes a microarray can uncover additional incidental findings, such as carrier status, that may require further evaluation.     We reviewed the benefits, limitations, and possible results from a microarray analysis which can include:    Negative: No clinically significant deletions or duplications were identified. This may not rule out a genetic cause for the fetal features.    Positive: A deletion, duplication, or genetic similarity was identified that may be associated with a particular set of symptoms or known syndrome.     Variant of uncertain significance (VUS): A deletion or duplication was identified, but it is not known if it explains the clinical features or it is is normal variation.    If the microarray returns a VUS, parental testing may be recommended to help clarify pathogenicity.       It was a pleasure to be involved with Dina s care. Face-to-face time of the meeting was 45 minutes.    GIANLUCA MILLER MS, [facility]  Genetic Counselor  [facility]  Office: [phone]   [facility]: [phone]   Fax: [phone]  [facility]

## 2024-04-29 ENCOUNTER — HOSPITAL ENCOUNTER (OUTPATIENT)
Dept: ULTRASOUND IMAGING | Facility: CLINIC | Age: 34
Discharge: HOME | End: 2024-04-29
Attending: OBSTETRICS & GYNECOLOGY
Payer: COMMERCIAL

## 2024-04-29 ENCOUNTER — OFFICE VISIT (OUTPATIENT)
Dept: MATERNAL FETAL MEDICINE | Facility: CLINIC | Age: 34
End: 2024-04-29
Attending: OBSTETRICS & GYNECOLOGY
Payer: COMMERCIAL

## 2024-04-29 ENCOUNTER — TRANSFERRED RECORDS (OUTPATIENT)
Dept: HEALTH INFORMATION MANAGEMENT | Facility: CLINIC | Age: 34
End: 2024-04-29

## 2024-04-29 DIAGNOSIS — O28.0 ABNORMAL MATERNAL SERUM SCREENING TEST: ICD-10-CM

## 2024-04-29 DIAGNOSIS — O28.9 ABNORMAL FINDINGS ON PRENATAL SCREENING: Primary | ICD-10-CM

## 2024-04-29 PROCEDURE — 76811 OB US DETAILED SNGL FETUS: CPT

## 2024-04-29 PROCEDURE — 76811 OB US DETAILED SNGL FETUS: CPT | Mod: 26 | Performed by: OBSTETRICS & GYNECOLOGY

## 2024-04-29 PROCEDURE — 99213 OFFICE O/P EST LOW 20 MIN: CPT | Mod: 25 | Performed by: OBSTETRICS & GYNECOLOGY

## 2024-05-28 ENCOUNTER — HOSPITAL ENCOUNTER (OUTPATIENT)
Dept: ULTRASOUND IMAGING | Facility: CLINIC | Age: 34
Discharge: HOME | End: 2024-05-28
Attending: OBSTETRICS & GYNECOLOGY
Payer: COMMERCIAL

## 2024-05-28 ENCOUNTER — OFFICE VISIT (OUTPATIENT)
Dept: MATERNAL FETAL MEDICINE | Facility: CLINIC | Age: 34
End: 2024-05-28
Attending: OBSTETRICS & GYNECOLOGY
Payer: COMMERCIAL

## 2024-05-28 DIAGNOSIS — O28.9 ABNORMAL FINDINGS ON PRENATAL SCREENING: ICD-10-CM

## 2024-05-28 DIAGNOSIS — O28.9 ABNORMAL FINDINGS ON PRENATAL SCREENING: Primary | ICD-10-CM

## 2024-05-28 PROCEDURE — 76816 OB US FOLLOW-UP PER FETUS: CPT | Mod: 26 | Performed by: OBSTETRICS & GYNECOLOGY

## 2024-05-28 PROCEDURE — 76816 OB US FOLLOW-UP PER FETUS: CPT

## 2024-05-28 NOTE — PROGRESS NOTES
"Please see \"Imaging\" tab under \"Chart Review\" for details of today's US at the [facility].    Jeff Watson MD  Maternal-Fetal Medicine    "

## 2024-08-17 LAB — GROUP B STREPTOCOCCUS (EXTERNAL): NEGATIVE

## 2024-09-08 ENCOUNTER — HEALTH MAINTENANCE LETTER (OUTPATIENT)
Age: 34
End: 2024-09-08

## 2024-09-11 ENCOUNTER — ANESTHESIA EVENT (OUTPATIENT)
Dept: SURGERY | Facility: CLINIC | Age: 34
End: 2024-09-11
Payer: COMMERCIAL

## 2024-09-11 ENCOUNTER — ANESTHESIA (OUTPATIENT)
Dept: SURGERY | Facility: CLINIC | Age: 34
End: 2024-09-11
Payer: COMMERCIAL

## 2024-09-11 ENCOUNTER — TELEPHONE (OUTPATIENT)
Dept: OBGYN | Facility: CLINIC | Age: 34
End: 2024-09-11
Payer: COMMERCIAL

## 2024-09-11 ENCOUNTER — HOSPITAL ENCOUNTER (INPATIENT)
Facility: CLINIC | Age: 34
LOS: 1 days | Discharge: HOME HEALTH | End: 2024-09-12
Attending: ADVANCED PRACTICE MIDWIFE | Admitting: REGISTERED NURSE
Payer: COMMERCIAL

## 2024-09-11 DIAGNOSIS — Z98.890 S/P D&C (STATUS POST DILATION AND CURETTAGE): Primary | ICD-10-CM

## 2024-09-11 DIAGNOSIS — O34.219 VBAC, DELIVERED, CURRENT HOSPITALIZATION: ICD-10-CM

## 2024-09-11 PROBLEM — Z36.89 ENCOUNTER FOR TRIAGE IN PREGNANT PATIENT: Status: ACTIVE | Noted: 2024-09-11

## 2024-09-11 LAB
ABO/RH(D): NORMAL
ANTIBODY SCREEN: NEGATIVE
APTT PPP: 25 SECONDS (ref 22–38)
BLD PROD TYP BPU: NORMAL
BLD PROD TYP BPU: NORMAL
BLOOD COMPONENT TYPE: NORMAL
BLOOD COMPONENT TYPE: NORMAL
CODING SYSTEM: NORMAL
CODING SYSTEM: NORMAL
CROSSMATCH: NORMAL
CROSSMATCH: NORMAL
D DIMER PPP FEU-MCNC: 1.53 UG/ML FEU (ref 0–0.5)
FIBRINOGEN PPP-MCNC: 386 MG/DL (ref 170–510)
HGB BLD-MCNC: 11.9 G/DL (ref 11.7–15.7)
HGB BLD-MCNC: 12.7 G/DL (ref 11.7–15.7)
HGB BLD-MCNC: 14.6 G/DL (ref 11.7–15.7)
INR PPP: 1.01 (ref 0.85–1.15)
PLATELET # BLD AUTO: 271 10E3/UL (ref 150–450)
SPECIMEN EXPIRATION DATE: NORMAL
UNIT ABO/RH: NORMAL
UNIT ABO/RH: NORMAL
UNIT NUMBER: NORMAL
UNIT NUMBER: NORMAL
UNIT STATUS: NORMAL
UNIT STATUS: NORMAL
UNIT TYPE ISBT: 5100
UNIT TYPE ISBT: 5100

## 2024-09-11 PROCEDURE — 360N000075 HC SURGERY LEVEL 2, PER MIN: Performed by: OBSTETRICS & GYNECOLOGY

## 2024-09-11 PROCEDURE — 36415 COLL VENOUS BLD VENIPUNCTURE: CPT | Performed by: REGISTERED NURSE

## 2024-09-11 PROCEDURE — 250N000011 HC RX IP 250 OP 636: Performed by: NURSE ANESTHETIST, CERTIFIED REGISTERED

## 2024-09-11 PROCEDURE — 250N000009 HC RX 250: Performed by: ADVANCED PRACTICE MIDWIFE

## 2024-09-11 PROCEDURE — 88342 IMHCHEM/IMCYTCHM 1ST ANTB: CPT | Mod: 26 | Performed by: PATHOLOGY

## 2024-09-11 PROCEDURE — 88342 IMHCHEM/IMCYTCHM 1ST ANTB: CPT | Mod: TC | Performed by: OBSTETRICS & GYNECOLOGY

## 2024-09-11 PROCEDURE — 85018 HEMOGLOBIN: CPT | Performed by: REGISTERED NURSE

## 2024-09-11 PROCEDURE — 86900 BLOOD TYPING SEROLOGIC ABO: CPT | Performed by: ADVANCED PRACTICE MIDWIFE

## 2024-09-11 PROCEDURE — 3E0R3BZ INTRODUCTION OF ANESTHETIC AGENT INTO SPINAL CANAL, PERCUTANEOUS APPROACH: ICD-10-PCS | Performed by: STUDENT IN AN ORGANIZED HEALTH CARE EDUCATION/TRAINING PROGRAM

## 2024-09-11 PROCEDURE — 85379 FIBRIN DEGRADATION QUANT: CPT | Performed by: REGISTERED NURSE

## 2024-09-11 PROCEDURE — 00HU33Z INSERTION OF INFUSION DEVICE INTO SPINAL CANAL, PERCUTANEOUS APPROACH: ICD-10-PCS | Performed by: STUDENT IN AN ORGANIZED HEALTH CARE EDUCATION/TRAINING PROGRAM

## 2024-09-11 PROCEDURE — 722N000001 HC LABOR CARE VAGINAL DELIVERY SINGLE

## 2024-09-11 PROCEDURE — 250N000011 HC RX IP 250 OP 636: Performed by: ADVANCED PRACTICE MIDWIFE

## 2024-09-11 PROCEDURE — 86923 COMPATIBILITY TEST ELECTRIC: CPT | Performed by: REGISTERED NURSE

## 2024-09-11 PROCEDURE — 250N000013 HC RX MED GY IP 250 OP 250 PS 637: Performed by: OBSTETRICS & GYNECOLOGY

## 2024-09-11 PROCEDURE — 258N000003 HC RX IP 258 OP 636: Performed by: REGISTERED NURSE

## 2024-09-11 PROCEDURE — 250N000011 HC RX IP 250 OP 636: Performed by: STUDENT IN AN ORGANIZED HEALTH CARE EDUCATION/TRAINING PROGRAM

## 2024-09-11 PROCEDURE — 10D17ZZ EXTRACTION OF PRODUCTS OF CONCEPTION, RETAINED, VIA NATURAL OR ARTIFICIAL OPENING: ICD-10-PCS | Performed by: OBSTETRICS & GYNECOLOGY

## 2024-09-11 PROCEDURE — 85018 HEMOGLOBIN: CPT | Performed by: OBSTETRICS & GYNECOLOGY

## 2024-09-11 PROCEDURE — 88305 TISSUE EXAM BY PATHOLOGIST: CPT | Mod: 26 | Performed by: PATHOLOGY

## 2024-09-11 PROCEDURE — 96372 THER/PROPH/DIAG INJ SC/IM: CPT | Performed by: ADVANCED PRACTICE MIDWIFE

## 2024-09-11 PROCEDURE — 250N000011 HC RX IP 250 OP 636: Performed by: REGISTERED NURSE

## 2024-09-11 PROCEDURE — 250N000009 HC RX 250: Performed by: NURSE ANESTHETIST, CERTIFIED REGISTERED

## 2024-09-11 PROCEDURE — 36415 COLL VENOUS BLD VENIPUNCTURE: CPT | Performed by: OBSTETRICS & GYNECOLOGY

## 2024-09-11 PROCEDURE — 272N000001 HC OR GENERAL SUPPLY STERILE: Performed by: OBSTETRICS & GYNECOLOGY

## 2024-09-11 PROCEDURE — 85384 FIBRINOGEN ACTIVITY: CPT | Performed by: REGISTERED NURSE

## 2024-09-11 PROCEDURE — 258N000003 HC RX IP 258 OP 636: Performed by: ADVANCED PRACTICE MIDWIFE

## 2024-09-11 PROCEDURE — 85049 AUTOMATED PLATELET COUNT: CPT | Performed by: REGISTERED NURSE

## 2024-09-11 PROCEDURE — 85730 THROMBOPLASTIN TIME PARTIAL: CPT | Performed by: REGISTERED NURSE

## 2024-09-11 PROCEDURE — 370N000017 HC ANESTHESIA TECHNICAL FEE, PER MIN: Performed by: OBSTETRICS & GYNECOLOGY

## 2024-09-11 PROCEDURE — 85018 HEMOGLOBIN: CPT | Performed by: ADVANCED PRACTICE MIDWIFE

## 2024-09-11 PROCEDURE — 85610 PROTHROMBIN TIME: CPT | Performed by: REGISTERED NURSE

## 2024-09-11 PROCEDURE — 0KQM0ZZ REPAIR PERINEUM MUSCLE, OPEN APPROACH: ICD-10-PCS | Performed by: OBSTETRICS & GYNECOLOGY

## 2024-09-11 RX ORDER — METHYLERGONOVINE MALEATE 0.2 MG/ML
200 INJECTION INTRAVENOUS
Status: CANCELLED | OUTPATIENT
Start: 2024-09-11

## 2024-09-11 RX ORDER — METOCLOPRAMIDE HYDROCHLORIDE 5 MG/ML
10 INJECTION INTRAMUSCULAR; INTRAVENOUS EVERY 6 HOURS PRN
Status: DISCONTINUED | OUTPATIENT
Start: 2024-09-11 | End: 2024-09-11 | Stop reason: HOSPADM

## 2024-09-11 RX ORDER — LOPERAMIDE HCL 2 MG
4 CAPSULE ORAL
Status: DISCONTINUED | OUTPATIENT
Start: 2024-09-11 | End: 2024-09-11 | Stop reason: HOSPADM

## 2024-09-11 RX ORDER — NALOXONE HYDROCHLORIDE 0.4 MG/ML
0.4 INJECTION, SOLUTION INTRAMUSCULAR; INTRAVENOUS; SUBCUTANEOUS
Status: CANCELLED | OUTPATIENT
Start: 2024-09-11

## 2024-09-11 RX ORDER — CITRIC ACID/SODIUM CITRATE 334-500MG
30 SOLUTION, ORAL ORAL
Status: DISCONTINUED | OUTPATIENT
Start: 2024-09-11 | End: 2024-09-11 | Stop reason: HOSPADM

## 2024-09-11 RX ORDER — ACETAMINOPHEN 325 MG/1
650 TABLET ORAL EVERY 4 HOURS PRN
Status: DISCONTINUED | OUTPATIENT
Start: 2024-09-11 | End: 2024-09-12 | Stop reason: HOSPADM

## 2024-09-11 RX ORDER — PRENATAL VIT/IRON FUM/FOLIC AC 27MG-0.8MG
1 TABLET ORAL DAILY
COMMUNITY

## 2024-09-11 RX ORDER — ONDANSETRON 2 MG/ML
4 INJECTION INTRAMUSCULAR; INTRAVENOUS EVERY 6 HOURS PRN
Status: DISCONTINUED | OUTPATIENT
Start: 2024-09-11 | End: 2024-09-11 | Stop reason: HOSPADM

## 2024-09-11 RX ORDER — ACETAMINOPHEN 325 MG/1
650 TABLET ORAL EVERY 4 HOURS PRN
Status: CANCELLED | OUTPATIENT
Start: 2024-09-11

## 2024-09-11 RX ORDER — ONDANSETRON 4 MG/1
4 TABLET, ORALLY DISINTEGRATING ORAL EVERY 30 MIN PRN
Status: CANCELLED | OUTPATIENT
Start: 2024-09-11

## 2024-09-11 RX ORDER — CITRIC ACID/SODIUM CITRATE 334-500MG
30 SOLUTION, ORAL ORAL
Status: CANCELLED | OUTPATIENT
Start: 2024-09-11

## 2024-09-11 RX ORDER — OXYTOCIN 10 [USP'U]/ML
10 INJECTION, SOLUTION INTRAMUSCULAR; INTRAVENOUS
Status: DISCONTINUED | OUTPATIENT
Start: 2024-09-11 | End: 2024-09-12 | Stop reason: HOSPADM

## 2024-09-11 RX ORDER — LOPERAMIDE HCL 2 MG
4 CAPSULE ORAL
Status: DISCONTINUED | OUTPATIENT
Start: 2024-09-11 | End: 2024-09-12 | Stop reason: HOSPADM

## 2024-09-11 RX ORDER — LIDOCAINE 40 MG/G
CREAM TOPICAL
Status: DISCONTINUED | OUTPATIENT
Start: 2024-09-11 | End: 2024-09-11 | Stop reason: HOSPADM

## 2024-09-11 RX ORDER — MISOPROSTOL 200 UG/1
800 TABLET ORAL
Status: DISCONTINUED | OUTPATIENT
Start: 2024-09-11 | End: 2024-09-11 | Stop reason: HOSPADM

## 2024-09-11 RX ORDER — ONDANSETRON 4 MG/1
4 TABLET, ORALLY DISINTEGRATING ORAL EVERY 6 HOURS PRN
Status: CANCELLED | OUTPATIENT
Start: 2024-09-11

## 2024-09-11 RX ORDER — ONDANSETRON 4 MG/1
4 TABLET, ORALLY DISINTEGRATING ORAL EVERY 6 HOURS PRN
Status: DISCONTINUED | OUTPATIENT
Start: 2024-09-11 | End: 2024-09-11 | Stop reason: HOSPADM

## 2024-09-11 RX ORDER — LOPERAMIDE HCL 2 MG
2 CAPSULE ORAL
Status: DISCONTINUED | OUTPATIENT
Start: 2024-09-11 | End: 2024-09-12 | Stop reason: HOSPADM

## 2024-09-11 RX ORDER — MISOPROSTOL 200 UG/1
400 TABLET ORAL
Status: DISCONTINUED | OUTPATIENT
Start: 2024-09-11 | End: 2024-09-11 | Stop reason: HOSPADM

## 2024-09-11 RX ORDER — NALOXONE HYDROCHLORIDE 0.4 MG/ML
0.2 INJECTION, SOLUTION INTRAMUSCULAR; INTRAVENOUS; SUBCUTANEOUS
Status: CANCELLED | OUTPATIENT
Start: 2024-09-11

## 2024-09-11 RX ORDER — SODIUM CHLORIDE, SODIUM LACTATE, POTASSIUM CHLORIDE, CALCIUM CHLORIDE 600; 310; 30; 20 MG/100ML; MG/100ML; MG/100ML; MG/100ML
INJECTION, SOLUTION INTRAVENOUS CONTINUOUS
Status: DISCONTINUED | OUTPATIENT
Start: 2024-09-11 | End: 2024-09-12 | Stop reason: HOSPADM

## 2024-09-11 RX ORDER — PROCHLORPERAZINE MALEATE 10 MG
10 TABLET ORAL EVERY 6 HOURS PRN
Status: CANCELLED | OUTPATIENT
Start: 2024-09-11

## 2024-09-11 RX ORDER — LOPERAMIDE HCL 2 MG
2 CAPSULE ORAL
Status: DISCONTINUED | OUTPATIENT
Start: 2024-09-11 | End: 2024-09-11 | Stop reason: HOSPADM

## 2024-09-11 RX ORDER — NALOXONE HYDROCHLORIDE 0.4 MG/ML
0.1 INJECTION, SOLUTION INTRAMUSCULAR; INTRAVENOUS; SUBCUTANEOUS
Status: CANCELLED | OUTPATIENT
Start: 2024-09-11

## 2024-09-11 RX ORDER — CEFAZOLIN SODIUM/WATER 2 G/20 ML
2 SYRINGE (ML) INTRAVENOUS
Status: COMPLETED | OUTPATIENT
Start: 2024-09-11 | End: 2024-09-11

## 2024-09-11 RX ORDER — HYDROCORTISONE 25 MG/G
CREAM TOPICAL 3 TIMES DAILY PRN
Status: DISCONTINUED | OUTPATIENT
Start: 2024-09-11 | End: 2024-09-12 | Stop reason: HOSPADM

## 2024-09-11 RX ORDER — OXYCODONE HYDROCHLORIDE 5 MG/1
5 TABLET ORAL
Status: CANCELLED | OUTPATIENT
Start: 2024-09-11

## 2024-09-11 RX ORDER — CARBOPROST TROMETHAMINE 250 UG/ML
250 INJECTION, SOLUTION INTRAMUSCULAR
Status: DISCONTINUED | OUTPATIENT
Start: 2024-09-11 | End: 2024-09-12 | Stop reason: HOSPADM

## 2024-09-11 RX ORDER — ONDANSETRON 2 MG/ML
4 INJECTION INTRAMUSCULAR; INTRAVENOUS EVERY 30 MIN PRN
Status: CANCELLED | OUTPATIENT
Start: 2024-09-11

## 2024-09-11 RX ORDER — IBUPROFEN 800 MG/1
800 TABLET, FILM COATED ORAL EVERY 6 HOURS PRN
Status: DISCONTINUED | OUTPATIENT
Start: 2024-09-11 | End: 2024-09-12 | Stop reason: HOSPADM

## 2024-09-11 RX ORDER — PROCHLORPERAZINE 25 MG
25 SUPPOSITORY, RECTAL RECTAL EVERY 12 HOURS PRN
Status: DISCONTINUED | OUTPATIENT
Start: 2024-09-11 | End: 2024-09-11 | Stop reason: HOSPADM

## 2024-09-11 RX ORDER — OXYTOCIN 10 [USP'U]/ML
10 INJECTION, SOLUTION INTRAMUSCULAR; INTRAVENOUS
Status: CANCELLED | OUTPATIENT
Start: 2024-09-11

## 2024-09-11 RX ORDER — IBUPROFEN 800 MG/1
800 TABLET, FILM COATED ORAL
Status: CANCELLED | OUTPATIENT
Start: 2024-09-11

## 2024-09-11 RX ORDER — FENTANYL CITRATE 50 UG/ML
50 INJECTION, SOLUTION INTRAMUSCULAR; INTRAVENOUS EVERY 30 MIN PRN
Status: CANCELLED | OUTPATIENT
Start: 2024-09-11

## 2024-09-11 RX ORDER — NALOXONE HYDROCHLORIDE 0.4 MG/ML
0.4 INJECTION, SOLUTION INTRAMUSCULAR; INTRAVENOUS; SUBCUTANEOUS
Status: DISCONTINUED | OUTPATIENT
Start: 2024-09-11 | End: 2024-09-11 | Stop reason: HOSPADM

## 2024-09-11 RX ORDER — OXYTOCIN 10 [USP'U]/ML
10 INJECTION, SOLUTION INTRAMUSCULAR; INTRAVENOUS
Status: DISCONTINUED | OUTPATIENT
Start: 2024-09-11 | End: 2024-09-11 | Stop reason: HOSPADM

## 2024-09-11 RX ORDER — OXYTOCIN/0.9 % SODIUM CHLORIDE 30/500 ML
340 PLASTIC BAG, INJECTION (ML) INTRAVENOUS CONTINUOUS PRN
Status: DISCONTINUED | OUTPATIENT
Start: 2024-09-11 | End: 2024-09-12 | Stop reason: HOSPADM

## 2024-09-11 RX ORDER — MODIFIED LANOLIN
OINTMENT (GRAM) TOPICAL
Status: DISCONTINUED | OUTPATIENT
Start: 2024-09-11 | End: 2024-09-12 | Stop reason: HOSPADM

## 2024-09-11 RX ORDER — KETOROLAC TROMETHAMINE 30 MG/ML
30 INJECTION, SOLUTION INTRAMUSCULAR; INTRAVENOUS
Status: COMPLETED | OUTPATIENT
Start: 2024-09-11 | End: 2024-09-11

## 2024-09-11 RX ORDER — METHYLERGONOVINE MALEATE 0.2 MG/ML
200 INJECTION INTRAVENOUS
Status: DISCONTINUED | OUTPATIENT
Start: 2024-09-11 | End: 2024-09-12 | Stop reason: HOSPADM

## 2024-09-11 RX ORDER — CARBOPROST TROMETHAMINE 250 UG/ML
250 INJECTION, SOLUTION INTRAMUSCULAR
Status: DISCONTINUED | OUTPATIENT
Start: 2024-09-11 | End: 2024-09-11 | Stop reason: HOSPADM

## 2024-09-11 RX ORDER — LOPERAMIDE HCL 2 MG
4 CAPSULE ORAL
Status: CANCELLED | OUTPATIENT
Start: 2024-09-11

## 2024-09-11 RX ORDER — PROCHLORPERAZINE 25 MG
25 SUPPOSITORY, RECTAL RECTAL EVERY 12 HOURS PRN
Status: CANCELLED | OUTPATIENT
Start: 2024-09-11

## 2024-09-11 RX ORDER — TRANEXAMIC ACID 10 MG/ML
1 INJECTION, SOLUTION INTRAVENOUS EVERY 30 MIN PRN
Status: CANCELLED | OUTPATIENT
Start: 2024-09-11

## 2024-09-11 RX ORDER — MISOPROSTOL 200 UG/1
800 TABLET ORAL
Status: CANCELLED | OUTPATIENT
Start: 2024-09-11

## 2024-09-11 RX ORDER — OXYTOCIN/0.9 % SODIUM CHLORIDE 30/500 ML
340 PLASTIC BAG, INJECTION (ML) INTRAVENOUS CONTINUOUS PRN
Status: CANCELLED | OUTPATIENT
Start: 2024-09-11

## 2024-09-11 RX ORDER — FENTANYL CITRATE-0.9 % NACL/PF 10 MCG/ML
100 PLASTIC BAG, INJECTION (ML) INTRAVENOUS EVERY 5 MIN PRN
Status: DISCONTINUED | OUTPATIENT
Start: 2024-09-11 | End: 2024-09-11 | Stop reason: HOSPADM

## 2024-09-11 RX ORDER — DEXAMETHASONE SODIUM PHOSPHATE 4 MG/ML
4 INJECTION, SOLUTION INTRA-ARTICULAR; INTRALESIONAL; INTRAMUSCULAR; INTRAVENOUS; SOFT TISSUE
Status: CANCELLED | OUTPATIENT
Start: 2024-09-11

## 2024-09-11 RX ORDER — MISOPROSTOL 200 UG/1
800 TABLET ORAL
Status: DISCONTINUED | OUTPATIENT
Start: 2024-09-11 | End: 2024-09-12 | Stop reason: HOSPADM

## 2024-09-11 RX ORDER — OXYTOCIN/0.9 % SODIUM CHLORIDE 30/500 ML
100-340 PLASTIC BAG, INJECTION (ML) INTRAVENOUS CONTINUOUS PRN
Status: CANCELLED | OUTPATIENT
Start: 2024-09-11

## 2024-09-11 RX ORDER — IBUPROFEN 800 MG/1
800 TABLET, FILM COATED ORAL
Status: COMPLETED | OUTPATIENT
Start: 2024-09-11 | End: 2024-09-11

## 2024-09-11 RX ORDER — MISOPROSTOL 200 UG/1
400 TABLET ORAL
Status: CANCELLED | OUTPATIENT
Start: 2024-09-11

## 2024-09-11 RX ORDER — LOPERAMIDE HCL 2 MG
2 CAPSULE ORAL
Status: CANCELLED | OUTPATIENT
Start: 2024-09-11

## 2024-09-11 RX ORDER — KETOROLAC TROMETHAMINE 30 MG/ML
30 INJECTION, SOLUTION INTRAMUSCULAR; INTRAVENOUS
Status: CANCELLED | OUTPATIENT
Start: 2024-09-11

## 2024-09-11 RX ORDER — NALOXONE HYDROCHLORIDE 0.4 MG/ML
0.2 INJECTION, SOLUTION INTRAMUSCULAR; INTRAVENOUS; SUBCUTANEOUS
Status: DISCONTINUED | OUTPATIENT
Start: 2024-09-11 | End: 2024-09-11 | Stop reason: HOSPADM

## 2024-09-11 RX ORDER — METOCLOPRAMIDE 10 MG/1
10 TABLET ORAL EVERY 6 HOURS PRN
Status: CANCELLED | OUTPATIENT
Start: 2024-09-11

## 2024-09-11 RX ORDER — PROCHLORPERAZINE MALEATE 10 MG
10 TABLET ORAL EVERY 6 HOURS PRN
Status: DISCONTINUED | OUTPATIENT
Start: 2024-09-11 | End: 2024-09-11 | Stop reason: HOSPADM

## 2024-09-11 RX ORDER — TRANEXAMIC ACID 10 MG/ML
1 INJECTION, SOLUTION INTRAVENOUS EVERY 30 MIN PRN
Status: DISCONTINUED | OUTPATIENT
Start: 2024-09-11 | End: 2024-09-12 | Stop reason: HOSPADM

## 2024-09-11 RX ORDER — ONDANSETRON 2 MG/ML
4 INJECTION INTRAMUSCULAR; INTRAVENOUS EVERY 6 HOURS PRN
Status: CANCELLED | OUTPATIENT
Start: 2024-09-11

## 2024-09-11 RX ORDER — OXYTOCIN/0.9 % SODIUM CHLORIDE 30/500 ML
340 PLASTIC BAG, INJECTION (ML) INTRAVENOUS CONTINUOUS PRN
Status: DISCONTINUED | OUTPATIENT
Start: 2024-09-11 | End: 2024-09-11 | Stop reason: HOSPADM

## 2024-09-11 RX ORDER — METOCLOPRAMIDE HYDROCHLORIDE 5 MG/ML
10 INJECTION INTRAMUSCULAR; INTRAVENOUS EVERY 6 HOURS PRN
Status: CANCELLED | OUTPATIENT
Start: 2024-09-11

## 2024-09-11 RX ORDER — SODIUM CHLORIDE, SODIUM LACTATE, POTASSIUM CHLORIDE, CALCIUM CHLORIDE 600; 310; 30; 20 MG/100ML; MG/100ML; MG/100ML; MG/100ML
INJECTION, SOLUTION INTRAVENOUS CONTINUOUS
Status: DISCONTINUED | OUTPATIENT
Start: 2024-09-11 | End: 2024-09-11 | Stop reason: HOSPADM

## 2024-09-11 RX ORDER — BISACODYL 10 MG
10 SUPPOSITORY, RECTAL RECTAL DAILY PRN
Status: DISCONTINUED | OUTPATIENT
Start: 2024-09-11 | End: 2024-09-12 | Stop reason: HOSPADM

## 2024-09-11 RX ORDER — CARBOPROST TROMETHAMINE 250 UG/ML
250 INJECTION, SOLUTION INTRAMUSCULAR
Status: CANCELLED | OUTPATIENT
Start: 2024-09-11

## 2024-09-11 RX ORDER — FENTANYL/ROPIVACAINE/NS/PF 2MCG/ML-.1
PLASTIC BAG, INJECTION (ML) EPIDURAL
Status: DISCONTINUED | OUTPATIENT
Start: 2024-09-11 | End: 2024-09-11 | Stop reason: HOSPADM

## 2024-09-11 RX ORDER — OXYTOCIN/0.9 % SODIUM CHLORIDE 30/500 ML
100-340 PLASTIC BAG, INJECTION (ML) INTRAVENOUS CONTINUOUS PRN
Status: DISCONTINUED | OUTPATIENT
Start: 2024-09-11 | End: 2024-09-12 | Stop reason: HOSPADM

## 2024-09-11 RX ORDER — DOCUSATE SODIUM 100 MG/1
100 CAPSULE, LIQUID FILLED ORAL DAILY
Status: DISCONTINUED | OUTPATIENT
Start: 2024-09-11 | End: 2024-09-12 | Stop reason: HOSPADM

## 2024-09-11 RX ORDER — ONDANSETRON 2 MG/ML
INJECTION INTRAMUSCULAR; INTRAVENOUS PRN
Status: DISCONTINUED | OUTPATIENT
Start: 2024-09-11 | End: 2024-09-11

## 2024-09-11 RX ORDER — LIDOCAINE HCL/EPINEPHRINE/PF 2%-1:200K
VIAL (ML) INJECTION PRN
Status: DISCONTINUED | OUTPATIENT
Start: 2024-09-11 | End: 2024-09-11

## 2024-09-11 RX ORDER — BUPIVACAINE HYDROCHLORIDE 2.5 MG/ML
INJECTION, SOLUTION EPIDURAL; INFILTRATION; INTRACAUDAL
Status: COMPLETED | OUTPATIENT
Start: 2024-09-11 | End: 2024-09-11

## 2024-09-11 RX ORDER — METOCLOPRAMIDE 10 MG/1
10 TABLET ORAL EVERY 6 HOURS PRN
Status: DISCONTINUED | OUTPATIENT
Start: 2024-09-11 | End: 2024-09-11 | Stop reason: HOSPADM

## 2024-09-11 RX ORDER — TRANEXAMIC ACID 10 MG/ML
1 INJECTION, SOLUTION INTRAVENOUS EVERY 30 MIN PRN
Status: DISCONTINUED | OUTPATIENT
Start: 2024-09-11 | End: 2024-09-11 | Stop reason: HOSPADM

## 2024-09-11 RX ORDER — MISOPROSTOL 200 UG/1
400 TABLET ORAL
Status: DISCONTINUED | OUTPATIENT
Start: 2024-09-11 | End: 2024-09-12 | Stop reason: HOSPADM

## 2024-09-11 RX ORDER — NALBUPHINE HYDROCHLORIDE 10 MG/ML
2.5-5 INJECTION, SOLUTION INTRAMUSCULAR; INTRAVENOUS; SUBCUTANEOUS EVERY 6 HOURS PRN
Status: DISCONTINUED | OUTPATIENT
Start: 2024-09-11 | End: 2024-09-12 | Stop reason: HOSPADM

## 2024-09-11 RX ORDER — METHYLERGONOVINE MALEATE 0.2 MG/ML
200 INJECTION INTRAVENOUS
Status: DISCONTINUED | OUTPATIENT
Start: 2024-09-11 | End: 2024-09-11 | Stop reason: HOSPADM

## 2024-09-11 RX ADMIN — ONDANSETRON 4 MG: 2 INJECTION INTRAMUSCULAR; INTRAVENOUS at 15:28

## 2024-09-11 RX ADMIN — METHYLERGONOVINE MALEATE 200 MCG: 0.2 INJECTION, SOLUTION INTRAMUSCULAR; INTRAVENOUS at 13:43

## 2024-09-11 RX ADMIN — SODIUM CHLORIDE, POTASSIUM CHLORIDE, SODIUM LACTATE AND CALCIUM CHLORIDE 1000 ML: 600; 310; 30; 20 INJECTION, SOLUTION INTRAVENOUS at 07:30

## 2024-09-11 RX ADMIN — Medication 100 ML/HR: at 15:20

## 2024-09-11 RX ADMIN — Medication 2 G: at 14:52

## 2024-09-11 RX ADMIN — ACETAMINOPHEN 650 MG: 325 TABLET ORAL at 16:03

## 2024-09-11 RX ADMIN — LIDOCAINE HYDROCHLORIDE,EPINEPHRINE BITARTRATE 8 ML: 20; .005 INJECTION, SOLUTION EPIDURAL; INFILTRATION; INTRACAUDAL; PERINEURAL at 14:52

## 2024-09-11 RX ADMIN — SODIUM CHLORIDE, POTASSIUM CHLORIDE, SODIUM LACTATE AND CALCIUM CHLORIDE: 600; 310; 30; 20 INJECTION, SOLUTION INTRAVENOUS at 14:44

## 2024-09-11 RX ADMIN — DOCUSATE SODIUM 100 MG: 100 CAPSULE, LIQUID FILLED ORAL at 16:03

## 2024-09-11 RX ADMIN — ACETAMINOPHEN 650 MG: 325 TABLET ORAL at 20:34

## 2024-09-11 RX ADMIN — SODIUM CHLORIDE, POTASSIUM CHLORIDE, SODIUM LACTATE AND CALCIUM CHLORIDE: 600; 310; 30; 20 INJECTION, SOLUTION INTRAVENOUS at 08:20

## 2024-09-11 RX ADMIN — KETOROLAC TROMETHAMINE 30 MG: 30 INJECTION, SOLUTION INTRAMUSCULAR at 16:14

## 2024-09-11 RX ADMIN — Medication 340 ML/HR: at 13:47

## 2024-09-11 RX ADMIN — BUPIVACAINE HYDROCHLORIDE 5 ML: 2.5 INJECTION, SOLUTION EPIDURAL; INFILTRATION; INTRACAUDAL at 08:13

## 2024-09-11 RX ADMIN — Medication: at 08:07

## 2024-09-11 RX ADMIN — TRANEXAMIC ACID 1 G: 10 INJECTION, SOLUTION INTRAVENOUS at 13:57

## 2024-09-11 ASSESSMENT — ACTIVITIES OF DAILY LIVING (ADL)
ADLS_ACUITY_SCORE: 18

## 2024-09-11 NOTE — L&D DELIVERY NOTE
Vaginal Delivery Note    Name: Dina Ocampo  : 1990  MRN: [id number]    PRE DELIVERY DIAGNOSIS  33 year old  3 Para 1011 at 40w1d      1) History of -breech at 6cm. Consent signed prenatally.    2) Low fetal fraction on NIPT x2--declined amnio    POST DELIVERY DIAGNOSIS  33 year old  @ 40w1d  Delivery of a viable infant weighing 7lb3oz   via   1) PPH with retained placenta requiring D&C    YOB: 2023     Birth Time: 1:32 PM       Augmentation No              Indication:   Induction No                      Indication:     Monitors: External     GBS: Negative    ROM: SROM  Fluid Type: Clear    Labor Analgesia/Anesthesia:Epidural    Cord pH obtained: No  Placenta Date/Time: 2024  1:37 PM   Placenta submitted to Pathology: No    Description of procedure:   33 year old  with PNC w/ [facility] and pregnancy complicated by  history of   presented to L&D with labor contractions.  Her hospital course was complicated by retained placenta requiring D&C with PPH 1278cc .  Patient progressed to complete with spontaneous rupture of membranes. Progressed well through early and active labor without complication. Onset of labor 0100, 5cm @ 0924, complete 1236.  Dina pushed with effective efforts and brought the baby to a slow controlled crown.  The anterior shoulder delivered easily with gentle downward traction paired with maternal efforts.   Placenta did not deliver intact, a 2cx3cm section was missing with examination. Given bleeding increasing a sweep was completed but unable to find a clear plane and remove remaining placental tissue. Dr. López called to bedside. Pitocin was running at that time, methergine and TXA given. Second IV with coag panel ordered. VS stable during this period. See Dr. López's note for other management, she performed a manual sweep at bedside but ultimately made decision to proceed with D&C.     Dina Ocampo and Marco welcomed their  daughter, name undecided.   Shoulder Dystocia No  Operative Vaginal Delivery No  Infant spontaneously delivered over an  2nd degree laceration.   It was repaired in the normal fashion using epidural anesthesia using 3-0 vicryl.  Infant delivered in ANGELO position.  Nuchal cord Yes    Delivered through    Placenta spontaneously delivered: 9/11/2024  1:37 PM  grossly intact with 3 vessel cord.  Infant:  Live, vigorous infant was handed to mom.    Delivery was complicated by retained placenta and postpartum hemorrhage Interventions included fundal massage, pitocin, methergine, TXA, and manual extraction of placenta.    Delivery QBL (mL): 1278 (prior to D&C)    Mother and Infant stable.    WILLIAM Carvajal Dr. remained available for consultation and collaboration as needed.      9/11/2024 2:43 PM

## 2024-09-11 NOTE — PROGRESS NOTES
Data: Patient presented to [facility]: 2024  6:15 AM.  Reason for maternal/fetal assessment is uterine contractions. Patient reports having contractions starting at 01:00. Patient denies nausea, vomiting, headache, visual disturbances, epigastric or RUQ pain, significant edema. Patient reports fetal movement is normal. Patient is a 40w1d . Prenatal record reviewed. Pregnancy has been uncomplicated. Support person is present.     Fetal HR baseline was 135, variability is moderate (amplitude range 6 to 25 bpm). Accelerations: increase 15 bpm above baseline lasting 15 seconds.   . Cervix 4.5 cm dilated and 70% effaced. Fetal station 0. Fetal presentation   per cervical exam. Membranes: intact.    Vital signs wnl. Patient reports pain and is not coping.     Action: Verbal consent for EFM. Triage assessment completed. Patient does not meet criteria for early labor discharge.     Response: Patient verbalized agreement with plan. Will contact [facility] with update and for further orders.

## 2024-09-11 NOTE — OP NOTE
DATE OF SERVICE: 2024    PREOPERATIVE DIAGNOSIS: s/p successful , retained placental fragments    POSTOPERATIVE DIAGNOSIS: Same    PROCEDURE: Exam under anesthesia, D&C, oversew of second degree perineal lac    SURGEON(S): Sandy López MD    ANESTHESIA: epidural    ESTIMATED BLOOD LOSS: 50cc    SPECIMENS: Uterine contents/retained placental fragments, sent to pathology    COMPLICATIONS: None.     HISTORY OF PRESENT ILLNESS:  Dina is a 33yr old  who had successful  delivery of her 7#3oz baby girl at 1332hrs with TUCKER Marquis CNM. Placenta delivered at 1337, but there was an adherent cotelydon and trailing membranes. Manual sweep retrieved a small piece of adherent placental tissue, but felt like there was more. Had postpartum hemorrhage of 1100cc; vitals remained stable and pre delivery Hgb 14.6.  I also did a manual sweep (had good epidural) and removed 4x4cm piece of placental tissue. Felt there was some stringy webbing to the adherent tissue. Bedside US appeared there may be more tissue in mid uterus. Recommended proceeding with exam under anesthesia and D&C in the OR. She agreed. Risks and benefits discussed and consent obtained to proceed.    OPERATIVE FINDINGS:  Uterus normal postpartum size. Small amount retained placental fragments and decidua obtained with D&C. Cervix walked and all intact. Prior CS scar palpated and intact. Second degree perineal lac repair sutures oversewed after the D&C.    PROCEDURE NOTE:  Patient was brought to the operating room and after dosing of additional epidural anesthesia, was prepped and draped in the dorsal lithotomy position.  A time out was called and the patient and the procedure were verified.  A lake catheter was placed into the bladder and faint blood tinged urine returned and cleared.  A sterile speculum was placed.  The cervix was grasped and walked in its entirety with ring forceps; found to be intact, Some friability of the anterior  cervix and just inside the anterior cervical canal. The banjo curette was gently inserted up through the cervix into the endometrial cavity, and guided to the fundus with my opposing hand for palpation.  Gentle curettage performed with return of small amount of retained placental fragments and decidua.  A slight gritty texture was noted afterwards. The speculum was removed. Manual sweep noted the cavity to be clear, firm at the fundus. Prior CS scar palpated and felt intact.  The vagina was swabbed clean.  There was a little disruption to the second degree perineal lac sutures, so this was oversewn with 3.0 vicryl.  The vagina was swabbed clean. Sterile spec placed one more time and there was not active bleeding from the uterus/cervix. Spec was removed.  The vagina was swabbed clean. There was no active bleeding at the conclusion. Urine was draining clear.  She was taken out of the lithotomy position.   Sponge and instrument counts were correct. There were no complications.   Patient tolerated the procedure well.  She was taken to the recovery room in good condition.      Sandy López MD

## 2024-09-11 NOTE — H&P
"HISTORY AND PHYSICAL UPDATE ADMISSION EXAM    Name: Dina Guevara  YOB: 1990  Medical Record Number: [id number]    History of Present Illness: Dina Guevara is a 33 year old female who is 40w1d pregnant and being admitted for labor management of TOLAC in early labor with desires for epidural. Supported by her partner, Marco.     Last growth US: 40w 21 % with AC 35%, cephalic 9/10/24    Estimated Date of Delivery: Sep 10, 2024    EGA 40w1d    OB History    Para Term  AB Living   3 1 1 0 1 1   SAB IAB Ectopic Multiple Live Births   0 0 1 0 1      # Outcome Date GA Lbr Thomas/2nd Weight Sex Type Anes PTL Lv   3 Current            2 Term 21 38w0d  2.88 kg (6 lb 5.6 oz) M CS-Unspec  N VIRY      Name: DENNIS GUEVARA      Apgar1: 8  Apgar5: 9   1 Ectopic      ECTOPIC           Lab Results   Component Value Date    AS Negative 2024    CHPCRT Negative 05/10/2022    GCPCRT Negative 05/10/2022    HGB 14.6 2024       Prenatal Complications:     1) History of -breech at 6cm. Consent signed prenatally.    2) Low fetal fraction on NIPT x2--declined amnio    Exam:      /66   Pulse 97   Temp 97.7  F (36.5  C) (Oral)   Resp 18   Ht 1.575 m (5' 2\")   Wt 90.7 kg (200 lb)   LMP 2023   SpO2 92%   BMI 36.58 kg/m      Fetal heart Rate Category NST reactive  Contractions 2-5 minutes    HEENT grossly normal  Neck: no lymphadenopathy or thryoidomegaly  Lungs CTAb  Heart RRR  ABD gravid, non-tender  EXT:  NO edema, moves freely  Vaginal exam /0 per RN  Membranes: intact  Cephalic by BSUS    Assessment: labor management  GBS negative  O positive    Plan: Admit - see IP orders  Pain medication: epidural in place and working well.   Continuous FM  Anticipate   Active management of third stage.    Prenatal record reviewed.  Dr. López aware of patient status and remains available for consultation and collaboration as needed.    Criss Marquis, " WILLIAM      9/11/2024   9:39 AM

## 2024-09-11 NOTE — ANESTHESIA PREPROCEDURE EVALUATION
"Anesthesia Pre-Procedure Evaluation    Patient: Dina Ocampo   MRN: [id number] : 1990        Procedure :           Past Medical History:   Diagnosis Date    No pertinent past medical history       Past Surgical History:   Procedure Laterality Date     SECTION        Allergies   Allergen Reactions    Sulfa Antibiotics       Social History     Tobacco Use    Smoking status: Never    Smokeless tobacco: Never   Substance Use Topics    Alcohol use: Yes     Comment: rare      Wt Readings from Last 1 Encounters:   24 90.7 kg (200 lb)        Anesthesia Evaluation            ROS/MED HX  ENT/Pulmonary:  - neg pulmonary ROS     Neurologic:  - neg neurologic ROS     Cardiovascular:  - neg cardiovascular ROS     METS/Exercise Tolerance:     Hematologic: Comments: Post partum hemorrhage      Musculoskeletal:       GI/Hepatic:     (+) GERD,                   Renal/Genitourinary:       Endo:     (+)               Obesity,       Psychiatric/Substance Use:     (+) psychiatric history depression       Infectious Disease:       Malignancy:       Other:            Physical Exam    Airway        Mallampati: II   TM distance: > 3 FB   Neck ROM: full   Mouth opening: > 3 cm    Respiratory Devices and Support         Dental  no notable dental history         Cardiovascular   cardiovascular exam normal          Pulmonary   pulmonary exam normal                OUTSIDE LABS:  CBC:   Lab Results   Component Value Date    WBC 6.9 2023    HGB 14.6 2024    HGB 14.6 2023    HCT 43.5 2023     2023     BMP:   Lab Results   Component Value Date     2023    POTASSIUM 4.2 2023    CHLORIDE 103 2023    CO2 25 2023    BUN 12.6 2023    CR 0.77 2023     (H) 2023     COAGS: No results found for: \"PTT\", \"INR\", \"FIBR\"  POC: No results found for: \"BGM\", \"HCG\", \"HCGS\"  HEPATIC:   Lab Results   Component Value Date    ALBUMIN 5.0 2023    " PROTTOTAL 7.2 09/11/2023    ALT 14 09/11/2023    AST 18 09/11/2023    ALKPHOS 55 09/11/2023    BILITOTAL 0.4 09/11/2023     OTHER:   Lab Results   Component Value Date    MARIBELL 9.6 09/11/2023       Anesthesia Plan    ASA Status:  3, emergent       Anesthesia Type: Epidural.              Consents    Anesthesia Plan(s) and associated risks, benefits, and realistic alternatives discussed. Questions answered and patient/representative(s) expressed understanding.     - Discussed:     - Discussed with:  Patient            Postoperative Care       PONV prophylaxis: Dexamethasone or Solumedrol, Ondansetron (or other 5HT-3)     Comments:    Other Comments: Addendum: Patient w/ PPH and likely retained placenta after delivery, requiring emergent D&C. ASA status updated to 3E accordingly. Epidural working well, will bolus for denser coverage and utilize for case. Will consider precedex bolus and propofol if patient not tolerating.       neg OB ROS.      Wu Zaragoza MD    I have reviewed the pertinent notes and labs in the chart from the past 30 days and (re)examined the patient.  Any updates or changes from those notes are reflected in this note.

## 2024-09-11 NOTE — TELEPHONE ENCOUNTER
Pt called stating she thinks she is in labor and is planning to come to the hospital. She is a  at 40+1. She also believes her water broke, clear fluid. Encouraged pt to come to hospital as soon as she is ready. Pt agreeable with plan.  AMY BARKSDALE RN on 2024 at 5:56 AM

## 2024-09-11 NOTE — ANESTHESIA PROCEDURE NOTES
"Epidural catheter Procedure Note    Pre-Procedure   Staff -        Anesthesiologist:  Wu Zaragoza MD       Performed By: anesthesiologist       Location: OB       Procedure Start/Stop Times: 9/11/2024 8:00 AM and 9/11/2024 8:16 AM       Pre-Anesthestic Checklist: patient identified, IV checked, risks and benefits discussed, informed consent, monitors and equipment checked, pre-op evaluation, at physician/surgeon's request and post-op pain management  Timeout:       Correct Patient: Yes        Correct Procedure: Yes        Correct Site: Yes        Correct Position: Yes   Procedure Documentation  Procedure: epidural catheter       Diagnosis: Labor analgesia       Patient Position: sitting       Skin prep: Chloraprep       Insertion Site: L3-4. (midline approach).       Technique: LORT saline        GREGORIA at 4 cm.       Needle Type: Azumioy needle       Needle Gauge: 17.        Needle Length (Inches): 3.5        Catheter: 19 G.          Catheter threaded easily.         5 cm epidural space.         Threaded 9 cm at skin.         # of attempts: 1 and  # of redirects:  0    Assessment/Narrative         Paresthesias: No.       Test dose of 3 mL lidocaine 1.5% w/ 1:200,000 epinephrine at 08:10 CDT.         Test dose negative, 3 minutes after injection, for signs of intravascular, subdural, or intrathecal injection.       Insertion/Infusion Method: LORT saline       Aspiration negative for Heme or CSF via Epidural Catheter.    Medication(s) Administered   0.25% Bupivacaine PF (Epidural) - EPIDURAL   5 mL - 9/11/2024 8:13:00 AM  Medication Administration Time: 9/11/2024 8:00 AM      FOR [facility] ([facility]) ONLY:   Pain Team Contact information: please page the Pain Team Via [employer]. Search \"Pain\". During daytime hours, please page the attending first. At night please page the resident first.      "

## 2024-09-11 NOTE — ANESTHESIA CARE TRANSFER NOTE
Patient: Dina Ocampo    Procedure: Procedure(s):  DILATION AND CURETTAGE, UTERUS       Diagnosis: Retained products of conception after delivery with complications [O73.1]  Diagnosis Additional Information: No value filed.    Anesthesia Type:   Epidural     Note:    Oropharynx: oropharynx clear of all foreign objects  Level of Consciousness: awake  Oxygen Supplementation: room air    Independent Airway: airway patency satisfactory and stable  Dentition: dentition unchanged  Vital Signs Stable: post-procedure vital signs reviewed and stable  Report to RN Given: handoff report given  Patient transferred to: Labor and Delivery    Handoff Report: Identifed the Patient, Identified the Reponsible Provider, Reviewed the pertinent medical history, Discussed the surgical course, Reviewed Intra-OP anesthesia mangement and issues during anesthesia, Set expectations for post-procedure period and Allowed opportunity for questions and acknowledgement of understanding      Vitals:  Vitals Value Taken Time   BP 97/57 09/11/24 1545   Temp 36.7  C (98.1  F) 09/11/24 1545   Pulse 110 09/11/24 1545   Resp 16 09/11/24 1545   SpO2 99 % 09/11/24 1545       Electronically Signed By: JAMAL Cleveland CRNA  September 11, 2024  3:45 PM

## 2024-09-12 VITALS
BODY MASS INDEX: 35.22 KG/M2 | DIASTOLIC BLOOD PRESSURE: 56 MMHG | OXYGEN SATURATION: 98 % | RESPIRATION RATE: 16 BRPM | HEART RATE: 88 BPM | HEIGHT: 62 IN | TEMPERATURE: 97.6 F | WEIGHT: 191.38 LBS | SYSTOLIC BLOOD PRESSURE: 112 MMHG

## 2024-09-12 PROBLEM — Z98.890 S/P D&C (STATUS POST DILATION AND CURETTAGE): Status: ACTIVE | Noted: 2024-09-12

## 2024-09-12 PROBLEM — O34.219 VBAC, DELIVERED, CURRENT HOSPITALIZATION: Status: ACTIVE | Noted: 2024-09-12

## 2024-09-12 PROBLEM — Z34.90 TERM PREGNANCY: Status: ACTIVE | Noted: 2024-09-12

## 2024-09-12 LAB — HGB BLD-MCNC: 9.7 G/DL (ref 11.7–15.7)

## 2024-09-12 PROCEDURE — 36415 COLL VENOUS BLD VENIPUNCTURE: CPT | Performed by: OBSTETRICS & GYNECOLOGY

## 2024-09-12 PROCEDURE — 250N000013 HC RX MED GY IP 250 OP 250 PS 637: Performed by: OBSTETRICS & GYNECOLOGY

## 2024-09-12 PROCEDURE — 120N000001 HC R&B MED SURG/OB

## 2024-09-12 PROCEDURE — 250N000013 HC RX MED GY IP 250 OP 250 PS 637: Performed by: ADVANCED PRACTICE MIDWIFE

## 2024-09-12 PROCEDURE — 85018 HEMOGLOBIN: CPT | Performed by: OBSTETRICS & GYNECOLOGY

## 2024-09-12 RX ORDER — FERROUS SULFATE 325(65) MG
325 TABLET ORAL DAILY
Status: DISCONTINUED | OUTPATIENT
Start: 2024-09-12 | End: 2024-09-12 | Stop reason: HOSPADM

## 2024-09-12 RX ORDER — DOCUSATE SODIUM 100 MG/1
100 CAPSULE, LIQUID FILLED ORAL DAILY
COMMUNITY
Start: 2024-09-13

## 2024-09-12 RX ORDER — IBUPROFEN 800 MG/1
800 TABLET, FILM COATED ORAL EVERY 6 HOURS PRN
COMMUNITY
Start: 2024-09-12

## 2024-09-12 RX ORDER — FERROUS SULFATE 325(65) MG
325 TABLET ORAL DAILY
COMMUNITY
Start: 2024-09-12

## 2024-09-12 RX ORDER — ACETAMINOPHEN 325 MG/1
650 TABLET ORAL EVERY 4 HOURS PRN
COMMUNITY
Start: 2024-09-12

## 2024-09-12 RX ADMIN — FERROUS SULFATE TAB 325 MG (65 MG ELEMENTAL FE) 325 MG: 325 (65 FE) TAB at 11:58

## 2024-09-12 RX ADMIN — DOCUSATE SODIUM 100 MG: 100 CAPSULE, LIQUID FILLED ORAL at 08:06

## 2024-09-12 RX ADMIN — IBUPROFEN 800 MG: 800 TABLET, FILM COATED ORAL at 00:42

## 2024-09-12 RX ADMIN — IBUPROFEN 800 MG: 800 TABLET, FILM COATED ORAL at 08:06

## 2024-09-12 ASSESSMENT — ACTIVITIES OF DAILY LIVING (ADL)
ADLS_ACUITY_SCORE: 18

## 2024-09-12 NOTE — PROGRESS NOTES
"Outreach Note for EPIC    Chart reviewed, discharge plan discussed with patient, needs assessed. Patient verbalizes understanding of plan, requests [facility] visit as ordered, [facility] nurse visit planned for [place] 9/15/24, Home Care Intake updated. Patient and , \"Gloria\", phone number is reported as correct in EMR.    Patient states she has good support at home, has baby care essentials, and feels ready to discharge today. Outreach RN will continue to follow and assist if needed with discharge plan. No additional needs identified at this time.      "

## 2024-09-12 NOTE — DISCHARGE INSTRUCTIONS
Warning Signs after Having a Baby    Keep this paper on your fridge or somewhere else where you can see it.    Call your provider if you have any of these symptoms up to 12 weeks after having your baby.    Thoughts of hurting yourself or your baby  Pain in your chest or trouble breathing  Severe headache not helped by pain medicine  Eyesight concerns (blurry vision, seeing spots or flashes of light, other changes to eyesight)  Fainting, shaking or other signs of a seizure    Call 9-1-1 if you feel that it is an emergency.     The symptoms below can happen to anyone after giving birth. They can be very serious. Call your provider if you have any of these warning signs.    My provider s phone number: _______________________    Losing too much blood (hemorrhage)    Call your provider if you soak through a pad in less than an hour or pass blood clots bigger than a golf ball. These may be signs that you are bleeding too much.    Blood clots in the legs or lungs    After you give birth, your body naturally clots its blood to help prevent blood loss. Sometimes this increased clotting can happen in other areas of the body, like the legs or lungs. This can block your blood flow and be very dangerous.     Call your provider if you:  Have a red, swollen spot on the back of your leg that is warm or painful when you touch it.   Are coughing up blood.     Infection    Call your provider if you have any of these symptoms:  Fever of 100.4 F (38 C) or higher.  Pain or redness around your stitches if you had an incision.   Any yellow, white, or green fluid coming from places where you had stitches or surgery.    Mood Problems (postpartum depression)    Many people feel sad or have mood changes after having a baby. But for some people, these mood swings are worse.     Call your provider right away if you feel so anxious or nervous that you can't care for yourself or your baby.    Preeclampsia (high blood pressure)    Even if you  didn't have high blood pressure when you were pregnant, you are at risk for the high blood pressure disease called preeclampsia. This risk can last up to 12 weeks after giving birth.     Call your provider if you have:   Pain on your right side under your rib cage  Sudden swelling in the hands and face    Remember: You know your body. If something doesn't feel right, get medical help.     For informational purposes only. Not to replace the advice of your health care provider. Copyright 2020 [facility]. All rights reserved. Clinically reviewed by Karine Parmar, RNC-OB, MSN. [employer] [id number] - Rev 02/23.

## 2024-09-12 NOTE — DISCHARGE SUMMARY
OB Discharge Summary      Date:  2024    Name:  Dina Ocampo  :  1990  MRN:  [id number]      Admission Date:  2024  Delivery Date: 2024   Gestational Age at Delivery:  40w1d  Discharge Date:  2024    Principal Diagnosis:    Patient Active Problem List   Diagnosis    Acne    Adjustment disorder with depressed mood    Breech birth    Delayed delivery after SROM (spontaneous rupture of membranes)    Major depressive disorder, recurrent episode, moderate (H)    Encounter for triage in pregnant patient    S/P D&C (status post dilation and curettage)    , delivered, current hospitalization    Term pregnancy         Conditions complicating Pregnancy:   1) History of -breech at 6cm. Consent signed prenatally.    2) Low fetal fraction on NIPT x2--declined amnio    Procedure(s) Performed:  , D&C for retained products    Indication for :    Indication for Induction:       Condition at Discharge:  stable with acute blood loss anemia    Discharge Medications:      Review of your medicines        START taking        Dose / Directions   acetaminophen 325 MG tablet  Commonly known as: TYLENOL  Used for: Care and examination of lactating mother      Dose: 650 mg  Take 2 tablets (650 mg) by mouth every 4 hours as needed for mild pain or fever (greater than or equal to 38 C /100.4 F (oral) or 38.5 C/ 101.4 F (core).).  Refills: 0     docusate sodium 100 MG capsule  Commonly known as: COLACE  Used for: Care and examination of lactating mother      Dose: 100 mg  Start taking on: 2024  Take 1 capsule (100 mg) by mouth daily.  Refills: 0     ferrous sulfate 325 (65 Fe) MG tablet  Commonly known as: FEROSUL  Used for: Care and examination of lactating mother      Dose: 325 mg  Take 1 tablet (325 mg) by mouth daily.  Refills: 0     ibuprofen 800 MG tablet  Commonly known as: ADVIL/MOTRIN  Used for: Care and examination of lactating mother      Dose: 800 mg  Take 1  tablet (800 mg) by mouth every 6 hours as needed for other (cramping).  Refills: 0            CONTINUE these medicines which have NOT CHANGED        Dose / Directions   prenatal multivitamin w/iron 27-0.8 MG tablet      Dose: 1 tablet  Take 1 tablet by mouth daily.  Refills: 0               Where to get your medicines        Some of these will need a paper prescription and others can be bought over the counter. Ask your nurse if you have questions.    You don't need a prescription for these medications  acetaminophen 325 MG tablet  docusate sodium 100 MG capsule  ferrous sulfate 325 (65 Fe) MG tablet  ibuprofen 800 MG tablet          Discharge Plan:    Follow up with /WILLIAM:  6 weeks or as needed   Patient Instructions:      Physical activity: As tolerated. Nothing in the vagina for 6 weeks.      Diet:  regular    Medication: see med list for OTC meds. Take oral iron daily, will recheck Hgb at 6 week PP visit    Other:        Physician/REINALDOM: Baldomero Gama CNM    Name:  Dina Ocampo  :  1990  MRN:  [id number]

## 2024-09-12 NOTE — PLAN OF CARE
"  Problem: Adult Inpatient Plan of Care  Goal: Plan of Care Review  Description: The Plan of Care Review/Shift note should be completed every shift.  The Outcome Evaluation is a brief statement about your assessment that the patient is improving, declining, or no change.  This information will be displayed automatically on your shift  note.  Outcome: Progressing  Goal: Patient-Specific Goal (Individualized)  Description: You can add care plan individualizations to a care plan. Examples of Individualization might be:  \"Parent requests to be called daily at 9am for status\", \"I have a hard time hearing out of my right ear\", or \"Do not touch me to wake me up as it startles  me\".  Outcome: Progressing  Goal: Absence of Hospital-Acquired Illness or Injury  Outcome: Progressing  Goal: Optimal Comfort and Wellbeing  Outcome: Progressing  Goal: Readiness for Transition of Care  Outcome: Progressing     Problem: Postpartum (Vaginal Delivery)  Goal: Successful Parent Role Transition  Outcome: Progressing  Goal: Hemostasis  Outcome: Progressing  Goal: Absence of Infection Signs and Symptoms  Outcome: Progressing  Goal: Anesthesia/Sedation Recovery  Outcome: Progressing  Goal: Optimal Pain Control and Function  Outcome: Progressing  Goal: Effective Urinary Elimination  Outcome: Progressing   Goal Outcome Evaluation:                        "

## 2024-09-12 NOTE — ANESTHESIA POSTPROCEDURE EVALUATION
Patient: Dina Ocampo    Procedure: Procedure(s):  DILATION AND CURETTAGE, UTERUS       Anesthesia Type:  Epidural    Note:  Disposition: Inpatient   Postop Pain Control: Uneventful            Sign Out: Well controlled pain   PONV: No   Neuro/Psych: Uneventful            Sign Out: Acceptable/Baseline neuro status   Airway/Respiratory: Uneventful            Sign Out: Acceptable/Baseline resp. status   CV/Hemodynamics: Uneventful            Sign Out: Acceptable CV status; No obvious hypovolemia; No obvious fluid overload   Other NRE: NONE   DID A NON-ROUTINE EVENT OCCUR?     Event details/Postop Comments:  No issues following epidural or D&C. Feels well this AM, and is without back pain or headache. No concerns at this time per patient.            Last vitals:  Vitals:    09/12/24 0400 09/12/24 0804 09/12/24 1200   BP: 93/54 120/66 112/56   Pulse:  94 88   Resp: 16 16 16   Temp: 36.7  C (98  F) 36.6  C (97.8  F) 36.4  C (97.6  F)   SpO2:  97% 98%       Electronically Signed By: Mercedes Marie MD  September 12, 2024  2:28 PM

## 2024-09-12 NOTE — PLAN OF CARE
"  Problem: Adult Inpatient Plan of Care  Goal: Plan of Care Review  Description: The Plan of Care Review/Shift note should be completed every shift.  The Outcome Evaluation is a brief statement about your assessment that the patient is improving, declining, or no change.  This information will be displayed automatically on your shift  note.  Outcome: Adequate for Care Transition  Goal: Patient-Specific Goal (Individualized)  Description: You can add care plan individualizations to a care plan. Examples of Individualization might be:  \"Parent requests to be called daily at 9am for status\", \"I have a hard time hearing out of my right ear\", or \"Do not touch me to wake me up as it startles  me\".  Outcome: Adequate for Care Transition  Goal: Absence of Hospital-Acquired Illness or Injury  Outcome: Adequate for Care Transition  Goal: Optimal Comfort and Wellbeing  Outcome: Adequate for Care Transition  Goal: Readiness for Transition of Care  Outcome: Adequate for Care Transition     Problem: Postpartum (Vaginal Delivery)  Goal: Successful Parent Role Transition  Outcome: Adequate for Care Transition  Goal: Hemostasis  Outcome: Adequate for Care Transition  Goal: Absence of Infection Signs and Symptoms  Outcome: Adequate for Care Transition  Goal: Anesthesia/Sedation Recovery  Outcome: Adequate for Care Transition  Goal: Optimal Pain Control and Function  Outcome: Adequate for Care Transition  Goal: Effective Urinary Elimination  Outcome: Adequate for Care Transition     Problem: Breastfeeding  Goal: Effective Breastfeeding  Outcome: Adequate for Care Transition   AVS reviewed by this RN. All questions answered. Patient to discharge home with family.    "

## 2024-09-12 NOTE — PROGRESS NOTES
"Vaginal Delivery Postpartum Day 1    Patient Name:  Dina Ocampo  :      1990  MRN:      [id number]      Assessment:  Normal postpartum course. Acute blood loss anemia.    Plan:  Continue current care.  Start oral iron. Discharge to home per pt request.     Subjective:  The patient feels well:  Voiding without difficulty, lochia normal, tolerating normal diet, ambulating without difficulty and passing flatus. Voiding independently without complication. Pain is well controlled with current medications.  The patient has no emotional concerns.  The baby is well and being fed by breast.      YOB: 2023   Birth Time: 1:32 PM     Prenatal Complications include:   1) History of -breech at 6cm. Consent signed prenatally.    2) Low fetal fraction on NIPT x2--declined amnio    Objective:  /66 (BP Location: Right arm, Patient Position: Semi-Cody's, Cuff Size: Adult Regular)   Pulse 94   Temp 97.8  F (36.6  C) (Oral)   Resp 16   Ht 1.575 m (5' 2\")   Wt 90.7 kg (200 lb)   LMP 2023   SpO2 97%   Breastfeeding Unknown   BMI 36.58 kg/m    Patient Vitals for the past 24 hrs:   BP Temp Temp src Pulse Resp SpO2   24 0804 120/66 97.8  F (36.6  C) Oral 94 16 97 %   24 0400 93/54 98  F (36.7  C) Oral -- 16 --   24 0000 120/67 98.1  F (36.7  C) Oral 102 17 --   24 2000 103/55 98.2  F (36.8  C) Oral -- 18 --   24 1740 116/64 98.2  F (36.8  C) Oral -- 18 --   24 -- -- -- -- -- 98 %   24 -- -- -- -- -- 99 %   24 -- -- -- -- -- 98 %   24 114/67 -- -- -- -- --   24 -- -- -- -- -- 98 %   24 -- -- -- -- -- 98 %   24 -- -- -- -- -- 98 %   24 117/64 -- -- -- -- --   24 -- -- -- -- -- 97 %   24 -- -- -- -- -- 97 %   24 -- -- -- -- -- 97 %   24 95/54 -- -- -- -- --   24 -- -- -- -- -- 97 %   24 -- -- -- -- -- 97 % "   09/11/24 1642 -- -- -- -- -- 97 %   09/11/24 1640 93/53 -- -- -- -- --   09/11/24 1637 -- -- -- -- -- 97 %   09/11/24 1632 -- -- -- -- -- 97 %   09/11/24 1627 -- -- -- -- -- 97 %   09/11/24 1622 (!) 89/52 -- -- -- -- 99 %   09/11/24 1617 -- -- -- -- -- 97 %   09/11/24 1612 -- -- -- -- -- 97 %   09/11/24 1610 106/55 -- -- -- -- --   09/11/24 1607 -- -- -- -- -- 99 %   09/11/24 1602 -- -- -- -- -- 97 %   09/11/24 1557 -- -- -- -- -- 98 %   09/11/24 1555 104/58 -- -- -- -- --   09/11/24 1552 -- -- -- -- -- 98 %   09/11/24 1551 -- -- -- -- -- 90 %   09/11/24 1545 97/57 98.1  F (36.7  C) Oral 110 16 99 %   09/11/24 1540 97/57 -- -- -- -- --   09/11/24 1539 -- -- -- -- -- 98 %   09/11/24 1439 -- -- -- -- -- 97 %   09/11/24 1435 138/88 -- -- -- -- --   09/11/24 1434 -- -- -- -- -- 97 %   09/11/24 1430 -- -- -- -- -- 93 %   09/11/24 1429 -- -- -- -- -- 98 %   09/11/24 1424 -- -- -- -- -- 99 %   09/11/24 1420 (!) 136/91 -- -- -- -- --   09/11/24 1419 -- -- -- -- -- 98 %   09/11/24 1414 -- -- -- -- -- 98 %   09/11/24 1405 135/85 -- -- -- -- --   09/11/24 1350 126/65 -- -- -- -- --   09/11/24 1240 134/77 97.7  F (36.5  C) Oral -- -- 96 %   09/11/24 1133 -- -- -- -- -- 95 %   09/11/24 1132 105/63 -- -- -- -- --   09/11/24 1102 115/74 -- -- -- -- --   09/11/24 1030 122/78 -- -- -- -- 97 %   09/11/24 1000 105/58 -- -- -- -- 98 %   09/11/24 0930 113/67 -- -- -- -- 98 %   09/11/24 0925 114/66 -- -- -- -- 92 %   09/11/24 0920 115/65 -- -- -- -- 97 %   09/11/24 0915 125/72 -- -- -- -- 97 %   09/11/24 0910 128/74 -- -- -- -- 97 %   09/11/24 0905 126/71 -- -- -- -- 96 %       Exam: Patient A&O x 3. No acute distress, breathing unlabored.The amount and color of the lochia is appropriate for the duration of recovery. Uterine fundus is firm.    Lab Results   Component Value Date    AS Negative 09/11/2024    CHPCRT Negative 05/10/2022    GCPCRT Negative 05/10/2022    HGB 9.7 (L) 09/12/2024       Immunization History   Administered  Date(s) Administered    COVID-19 Monovalent 18+ (Moderna) 06/01/2021, 12/21/2021    DTAP (<7y) 01/23/1992, 03/03/1992, 04/22/1992, 04/26/1993, 05/06/1997    HIB (PRP-T) 01/23/1992, 03/03/1992, 04/22/1992, 04/26/1993    HIB, Unspecified 01/23/1992, 03/03/1992, 04/22/1992, 04/26/1993    HPV Quadrivalent 01/04/2008, 03/24/2008, 11/17/2010    Hepatitis A (ADULT 19+) 09/20/2016    Hepatitis B, Adult 05/06/1997, 06/10/1997, 11/06/1997    Hepatitis B, Peds 05/06/1997, 06/10/1997, 11/06/1997    Influenza (IIV3) PF 10/18/2010, 11/25/2011, 12/12/2012    Influenza Vaccine >6 months,quad, PF 01/22/2014, 10/07/2014, 09/20/2016, 11/14/2019, 10/30/2020    Influenza Vaccine, 6+MO IM (QUADRIVALENT W/PRESERVATIVES) 01/22/2014    MMR 01/02/1993, 01/02/2003, 06/12/2003    OPV, trivalent, live 01/23/1992, 03/03/1992, 04/26/1993, 05/06/1997    Poliovirus, inactivated (IPV) 01/23/1992, 03/03/1992, 04/26/1993, 05/06/1997    Rabies - IM Fibroblast Culture 08/26/2005, 08/29/2005, 09/02/2005, 09/09/2005    Rabies Vaccine 08/26/2005, 08/29/2005, 09/02/2005, 09/09/2005    TDAP (Adacel,Boostrix) 09/20/2016    Td (Adult), Adsorbed 06/12/2003    Typhoid IM 09/27/2016       Provider:  Baldomero Gama CNM    Date:  9/12/2024  Time:  9:03 AM

## 2024-09-12 NOTE — PLAN OF CARE
Problem: Breastfeeding  Goal: Effective Breastfeeding  Outcome: Progressing     Problem: Postpartum (Vaginal Delivery)  Goal: Effective Urinary Elimination  Outcome: Progressing     Problem: Postpartum (Vaginal Delivery)  Goal: Optimal Pain Control and Function  Outcome: Progressing   Goal Outcome Evaluation:         Pt doing well. Baby breastfeeding on demand every 2-4 hours with supplementation of donor breast milk. Pain controlled with ibuprofen and tylenol. Up independently still needs 2 measured voids, as first two were under 200ml. Pt was bladder scanned at 0115, and found to only have 67ml in bladder. Pt educated to drink more water and try to pee again within the next few hours following. Postpartum assessment WNL, see flowsheets for more information. Significant other, Marco at bedside, supportive. Caregiver education and support on-going.    Blessing Marks RN

## 2024-09-17 LAB
PATH REPORT.COMMENTS IMP SPEC: NORMAL
PATH REPORT.FINAL DX SPEC: NORMAL
PATH REPORT.GROSS SPEC: NORMAL
PATH REPORT.MICROSCOPIC SPEC OTHER STN: NORMAL
PATH REPORT.RELEVANT HX SPEC: NORMAL
PHOTO IMAGE: NORMAL

## 2024-10-16 ENCOUNTER — MEDICAL CORRESPONDENCE (OUTPATIENT)
Dept: HEALTH INFORMATION MANAGEMENT | Facility: CLINIC | Age: 34
End: 2024-10-16
Payer: COMMERCIAL

## 2024-11-05 ASSESSMENT — PATIENT HEALTH QUESTIONNAIRE - PHQ9: SUM OF ALL RESPONSES TO PHQ QUESTIONS 1-9: 7

## 2024-11-14 ENCOUNTER — MEDICAL CORRESPONDENCE (OUTPATIENT)
Dept: HEALTH INFORMATION MANAGEMENT | Facility: CLINIC | Age: 34
End: 2024-11-14
Payer: COMMERCIAL

## 2025-05-04 ENCOUNTER — OFFICE VISIT (OUTPATIENT)
Dept: URGENT CARE | Facility: URGENT CARE | Age: 35
End: 2025-05-04
Payer: COMMERCIAL

## 2025-05-04 VITALS
BODY MASS INDEX: 30.12 KG/M2 | DIASTOLIC BLOOD PRESSURE: 80 MMHG | OXYGEN SATURATION: 97 % | WEIGHT: 164.7 LBS | HEART RATE: 83 BPM | SYSTOLIC BLOOD PRESSURE: 119 MMHG | RESPIRATION RATE: 18 BRPM | TEMPERATURE: 97.5 F

## 2025-05-04 DIAGNOSIS — J01.00 ACUTE NON-RECURRENT MAXILLARY SINUSITIS: Primary | ICD-10-CM

## 2025-05-04 PROCEDURE — 99203 OFFICE O/P NEW LOW 30 MIN: CPT | Performed by: INTERNAL MEDICINE

## 2025-05-04 PROCEDURE — 3074F SYST BP LT 130 MM HG: CPT | Performed by: INTERNAL MEDICINE

## 2025-05-04 PROCEDURE — 3079F DIAST BP 80-89 MM HG: CPT | Performed by: INTERNAL MEDICINE

## 2025-05-04 RX ORDER — AMOXICILLIN 875 MG/1
875 TABLET, COATED ORAL 2 TIMES DAILY
Qty: 20 TABLET | Refills: 0 | Status: SHIPPED | OUTPATIENT
Start: 2025-05-04 | End: 2025-05-14

## 2025-05-04 NOTE — PROGRESS NOTES
Urgent Care Clinic Visit    Chief Complaint   Patient presents with    Urgent Care    Sinus Problem     Left sinus pressure/ ear pain for x4 days. Tx otc- tylenol               5/4/2025     9:19 AM   Additional Questions   Roomed by Chandni   Accompanied by self

## 2025-05-04 NOTE — LETTER
2025    Dina Ocampo   1990        To Whom it May Concern;    I have seen Dina Ocampo in the Urgent Care on 2025.  Please excuse absences from work for this date.    Sincerely,        Sheng Jacques MD

## 2025-05-04 NOTE — PROGRESS NOTES
SUBJECTIVE:  Chief complaint of nasal and sinus congestion.  Pain in the left maxillary region radiating into the teeth.  Some left ear pressure.  Duration of symptoms = 4 days.  Some history of allergic rhinitis. Manages with OTC antihistamines. Denies fevers.  Denies wheezing, shortness of breath or chest congestion.     ROS:  The following systems have been completely reviewed and are negative except as noted in the HPI: CONSTITUTIONAL, HEAD AND NECK, CARDIOVASCULAR, PULMONARY, and GASTROINTESTINAL    OBJECTIVE:  /80   Pulse 83   Temp 97.5  F (36.4  C) (Tympanic)   Resp 18   Wt 74.7 kg (164 lb 11.2 oz)   SpO2 97%   Breastfeeding Yes   BMI 30.12 kg/m      GENERAL: healthy, alert and no distress  EYES: conjunctivae and sclerae normal  HENT: ear canals and TM's normal and cobblestoning and edema of the nasal mucosa with clear rhinorrhea, cobblestoning of the posterior pharynx with post-nasal drainage, left maxillary sinus tenderness  NECK: cervical adenopathy is shotty in the submandibular region   RESP: clear to auscultation and percussion bilaterally; normal I:E ratio  CV: regular rates and rhythm, normal S1 S2, no S3 or S4 and no murmur, click or rub -.    ASSESSMENT/PLAN:    ICD-10-CM    1. Acute non-recurrent maxillary sinusitis  J01.00 amoxicillin (AMOXIL) 875 MG tablet      Discussed wait and see approach to sinusitis.  Advised patient to not fill antibiotic today -- give this a few days with anti-inflammatory therapy -- if failing to resolve then start amoxicillin.    Sheng Jacques MD

## 2025-05-04 NOTE — PATIENT INSTRUCTIONS
There is a good chance of clearing this without antibiotics in the next several days.  Start using fluticasone (Flonase) nasal spray -- 1 spray to each nostril daily -- to reduce inflammation and promote better clearance of secretions.  Can keep using ibuprofen as needed for pain.  If symptoms are not improving in the next several days then start antibiotics.

## (undated) DEVICE — BRIEF STRETCH XL MPS40

## (undated) DEVICE — GOWN LG DISP 9515

## (undated) DEVICE — SOL WATER IRRIG 1000ML BOTTLE 2F7114

## (undated) DEVICE — Device

## (undated) DEVICE — PACK MINOR SINGLE BASIN SSK3001

## (undated) DEVICE — SOL NACL 0.9% IRRIG 1000ML BOTTLE 2F7124

## (undated) DEVICE — JELLY LUBRICATING SURGILUBE 2OZ TUBE

## (undated) DEVICE — GLOVE PI ULTRATCH M LF SZ 6.5 PF CUFF TEXT STRL LF 42665

## (undated) DEVICE — CUSTOM PACK PERI GYN SMA5BPGHEA

## (undated) DEVICE — PREP DYNA-HEX 4% CHG SCRUB 4OZ BOTTLE MDS098710

## (undated) DEVICE — DRSG TELFA 3X4" 1050